# Patient Record
Sex: FEMALE | Race: WHITE | Employment: UNEMPLOYED | URBAN - METROPOLITAN AREA
[De-identification: names, ages, dates, MRNs, and addresses within clinical notes are randomized per-mention and may not be internally consistent; named-entity substitution may affect disease eponyms.]

---

## 2021-11-15 ENCOUNTER — HOSPITAL ENCOUNTER (OUTPATIENT)
Dept: LAB | Age: 71
Discharge: HOME | End: 2021-11-15
Payer: MEDICARE

## 2021-11-15 LAB
APPEARANCE UR: CLEAR
BILIRUB UR QL: NEGATIVE
COLOR UR: YELLOW
GLUCOSE UR STRIP.AUTO-MCNC: NEGATIVE MG/DL
HGB UR QL STRIP: NEGATIVE
KETONES UR QL STRIP.AUTO: ABNORMAL MG/DL
LEUKOCYTE ESTERASE UR QL STRIP.AUTO: NEGATIVE
NITRITE UR QL STRIP.AUTO: NEGATIVE
PH UR STRIP: 5.5 [PH] (ref 5–9)
PROT UR STRIP-MCNC: NEGATIVE MG/DL
SP GR UR REFRACTOMETRY: 1.01 (ref 1–1.02)
UROBILINOGEN UR QL STRIP.AUTO: 0.2 EU/DL (ref 0.2–1)

## 2021-11-15 PROCEDURE — 81003 URINALYSIS AUTO W/O SCOPE: CPT

## 2021-11-15 PROCEDURE — 87086 URINE CULTURE/COLONY COUNT: CPT

## 2021-11-18 LAB
BACTERIA SPEC CULT: NORMAL
SERVICE CMNT-IMP: NORMAL

## 2022-01-18 ENCOUNTER — TRANSCRIBE ORDER (OUTPATIENT)
Dept: SCHEDULING | Age: 72
End: 2022-01-18

## 2022-01-18 DIAGNOSIS — Z12.31 SCREENING MAMMOGRAM FOR BREAST CANCER: Primary | ICD-10-CM

## 2023-02-22 ENCOUNTER — HOSPITAL ENCOUNTER (INPATIENT)
Age: 73
LOS: 21 days | Discharge: NURSING, REHAB OR LONG-TERM CARE | End: 2023-03-24
Attending: EMERGENCY MEDICINE | Admitting: INTERNAL MEDICINE
Payer: MEDICARE

## 2023-02-22 ENCOUNTER — APPOINTMENT (OUTPATIENT)
Dept: GENERAL RADIOLOGY | Age: 73
End: 2023-02-22
Payer: MEDICARE

## 2023-02-22 ENCOUNTER — APPOINTMENT (OUTPATIENT)
Dept: CT IMAGING | Age: 73
End: 2023-02-22
Payer: MEDICARE

## 2023-02-22 DIAGNOSIS — F03.C0 SEVERE DEMENTIA, UNSPECIFIED DEMENTIA TYPE, UNSPECIFIED WHETHER BEHAVIORAL, PSYCHOTIC, OR MOOD DISTURBANCE OR ANXIETY (HCC): Primary | ICD-10-CM

## 2023-02-22 DIAGNOSIS — G89.29 CHRONIC MIDLINE LOW BACK PAIN WITHOUT SCIATICA: ICD-10-CM

## 2023-02-22 DIAGNOSIS — M54.50 CHRONIC MIDLINE LOW BACK PAIN WITHOUT SCIATICA: ICD-10-CM

## 2023-02-22 LAB
ALBUMIN SERPL-MCNC: 3.4 G/DL (ref 3.2–4.6)
ALBUMIN/GLOB SERPL: 0.9 (ref 0.4–1.6)
ALP SERPL-CCNC: 62 U/L (ref 50–136)
ALT SERPL-CCNC: 23 U/L (ref 12–65)
ANION GAP SERPL CALC-SCNC: 5 MMOL/L (ref 2–11)
APPEARANCE UR: ABNORMAL
AST SERPL-CCNC: 37 U/L (ref 15–37)
BACTERIA URNS QL MICRO: 0 /HPF
BASOPHILS # BLD: 0 K/UL (ref 0–0.2)
BASOPHILS NFR BLD: 1 % (ref 0–2)
BILIRUB SERPL-MCNC: 0.4 MG/DL (ref 0.2–1.1)
BILIRUB UR QL: ABNORMAL
BUN SERPL-MCNC: 17 MG/DL (ref 8–23)
CALCIUM SERPL-MCNC: 9.3 MG/DL (ref 8.3–10.4)
CHLORIDE SERPL-SCNC: 104 MMOL/L (ref 101–110)
CO2 SERPL-SCNC: 25 MMOL/L (ref 21–32)
COLOR UR: ABNORMAL
CREAT SERPL-MCNC: 1.1 MG/DL (ref 0.6–1)
DIFFERENTIAL METHOD BLD: ABNORMAL
EKG ATRIAL RATE: 97 BPM
EKG DIAGNOSIS: NORMAL
EKG P AXIS: 48 DEGREES
EKG P-R INTERVAL: 138 MS
EKG Q-T INTERVAL: 355 MS
EKG QRS DURATION: 88 MS
EKG QTC CALCULATION (BAZETT): 449 MS
EKG R AXIS: 106 DEGREES
EKG T AXIS: 101 DEGREES
EKG VENTRICULAR RATE: 96 BPM
EOSINOPHIL # BLD: 0.2 K/UL (ref 0–0.8)
EOSINOPHIL NFR BLD: 3 % (ref 0.5–7.8)
EPI CELLS #/AREA URNS HPF: ABNORMAL /HPF
ERYTHROCYTE [DISTWIDTH] IN BLOOD BY AUTOMATED COUNT: 17.1 % (ref 11.9–14.6)
GLOBULIN SER CALC-MCNC: 3.7 G/DL (ref 2.8–4.5)
GLUCOSE SERPL-MCNC: 93 MG/DL (ref 65–100)
GLUCOSE UR STRIP.AUTO-MCNC: NEGATIVE MG/DL
HCT VFR BLD AUTO: 36.6 % (ref 35.8–46.3)
HGB BLD-MCNC: 11.8 G/DL (ref 11.7–15.4)
HGB UR QL STRIP: NEGATIVE
IMM GRANULOCYTES # BLD AUTO: 0 K/UL (ref 0–0.5)
IMM GRANULOCYTES NFR BLD AUTO: 0 % (ref 0–5)
KETONES UR QL STRIP.AUTO: ABNORMAL MG/DL
LACTATE SERPL-SCNC: 1 MMOL/L (ref 0.4–2)
LEUKOCYTE ESTERASE UR QL STRIP.AUTO: ABNORMAL
LIPASE SERPL-CCNC: 89 U/L (ref 73–393)
LYMPHOCYTES # BLD: 1.5 K/UL (ref 0.5–4.6)
LYMPHOCYTES NFR BLD: 26 % (ref 13–44)
MCH RBC QN AUTO: 28.7 PG (ref 26.1–32.9)
MCHC RBC AUTO-ENTMCNC: 32.2 G/DL (ref 31.4–35)
MCV RBC AUTO: 89.1 FL (ref 82–102)
MONOCYTES # BLD: 0.5 K/UL (ref 0.1–1.3)
MONOCYTES NFR BLD: 9 % (ref 4–12)
NEUTS SEG # BLD: 3.6 K/UL (ref 1.7–8.2)
NEUTS SEG NFR BLD: 62 % (ref 43–78)
NITRITE UR QL STRIP.AUTO: NEGATIVE
NRBC # BLD: 0 K/UL (ref 0–0.2)
PH UR STRIP: 6.5 (ref 5–9)
PLATELET # BLD AUTO: 260 K/UL (ref 150–450)
PMV BLD AUTO: 9.7 FL (ref 9.4–12.3)
POTASSIUM SERPL-SCNC: 4 MMOL/L (ref 3.5–5.1)
PROT SERPL-MCNC: 7.1 G/DL (ref 6.3–8.2)
PROT UR STRIP-MCNC: ABNORMAL MG/DL
RBC # BLD AUTO: 4.11 M/UL (ref 4.05–5.2)
RBC #/AREA URNS HPF: ABNORMAL /HPF
SODIUM SERPL-SCNC: 134 MMOL/L (ref 133–143)
SP GR UR REFRACTOMETRY: 1.03 (ref 1–1.02)
TSH W FREE THYROID IF ABNORMAL: 1 UIU/ML (ref 0.36–3.74)
UROBILINOGEN UR QL STRIP.AUTO: 1 EU/DL (ref 0.2–1)
WBC # BLD AUTO: 5.7 K/UL (ref 4.3–11.1)
WBC URNS QL MICRO: ABNORMAL /HPF

## 2023-02-22 PROCEDURE — 81001 URINALYSIS AUTO W/SCOPE: CPT

## 2023-02-22 PROCEDURE — 94761 N-INVAS EAR/PLS OXIMETRY MLT: CPT

## 2023-02-22 PROCEDURE — 71045 X-RAY EXAM CHEST 1 VIEW: CPT

## 2023-02-22 PROCEDURE — 84443 ASSAY THYROID STIM HORMONE: CPT

## 2023-02-22 PROCEDURE — 6370000000 HC RX 637 (ALT 250 FOR IP): Performed by: EMERGENCY MEDICINE

## 2023-02-22 PROCEDURE — 83605 ASSAY OF LACTIC ACID: CPT

## 2023-02-22 PROCEDURE — 80053 COMPREHEN METABOLIC PANEL: CPT

## 2023-02-22 PROCEDURE — 2580000003 HC RX 258: Performed by: EMERGENCY MEDICINE

## 2023-02-22 PROCEDURE — 85025 COMPLETE CBC W/AUTO DIFF WBC: CPT

## 2023-02-22 PROCEDURE — 99285 EMERGENCY DEPT VISIT HI MDM: CPT

## 2023-02-22 PROCEDURE — 93005 ELECTROCARDIOGRAM TRACING: CPT | Performed by: EMERGENCY MEDICINE

## 2023-02-22 PROCEDURE — 83690 ASSAY OF LIPASE: CPT

## 2023-02-22 PROCEDURE — 70450 CT HEAD/BRAIN W/O DYE: CPT

## 2023-02-22 RX ORDER — TRAZODONE HYDROCHLORIDE 50 MG/1
50 TABLET ORAL NIGHTLY
Status: DISCONTINUED | OUTPATIENT
Start: 2023-02-22 | End: 2023-03-11

## 2023-02-22 RX ORDER — CITALOPRAM 20 MG/1
40 TABLET ORAL DAILY
Status: DISCONTINUED | OUTPATIENT
Start: 2023-02-23 | End: 2023-03-09

## 2023-02-22 RX ORDER — 0.9 % SODIUM CHLORIDE 0.9 %
1000 INTRAVENOUS SOLUTION INTRAVENOUS
Status: COMPLETED | OUTPATIENT
Start: 2023-02-22 | End: 2023-02-22

## 2023-02-22 RX ADMIN — TRAZODONE HYDROCHLORIDE 50 MG: 50 TABLET ORAL at 21:47

## 2023-02-22 RX ADMIN — SODIUM CHLORIDE 1000 ML: 900 INJECTION, SOLUTION INTRAVENOUS at 16:20

## 2023-02-22 ASSESSMENT — ENCOUNTER SYMPTOMS
VISUAL CHANGE: 0
SORE THROAT: 0
ABDOMINAL PAIN: 0
NAUSEA: 0
DIARRHEA: 0
RHINORRHEA: 0
VOMITING: 0
CONSTIPATION: 0
BACK PAIN: 0
DIFFICULTY BREATHING: 0
COLOR CHANGE: 0
SHORTNESS OF BREATH: 0
COUGH: 0
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
## 2023-02-22 NOTE — ED TRIAGE NOTES
Patient brought into triage with family. Per patient family patient has had increased confusion that has gradually increased. Patient family is concerned due to patient safety. Patient family states she is unable to be alone. Patient family states that patient was referred to the ER via her PCP. Patient family looking for help on what the next steps are. [id number] Exp Problem Focused - Mod. Complex

## 2023-02-22 NOTE — Clinical Note
Discharge Plan[de-identified] Extended Care Facility (e.g. Adult Home, Nursing Home, etc.) Comment: see case management notes   Telemetry/Cardiac Monitoring Required?: No
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
## 2023-02-23 PROCEDURE — 6370000000 HC RX 637 (ALT 250 FOR IP): Performed by: EMERGENCY MEDICINE

## 2023-02-23 RX ADMIN — TRAZODONE HYDROCHLORIDE 50 MG: 50 TABLET ORAL at 21:08

## 2023-02-23 RX ADMIN — CITALOPRAM HYDROBROMIDE 40 MG: 20 TABLET ORAL at 10:36

## 2023-02-23 NOTE — ED NOTES
Report given to [facility], transfer of care at this time.      Jeff Nelson, GIOVANNA  02/22/23 2601

## 2023-02-23 NOTE — CARE COORDINATION
Chart reviewed, Psych saw last evening and recommended placement. I have faxed clinicals to. .. Elian Gaspar. .. ARLEEN mccord/Stacia  [facility]. .... do not have a appropriate bed for pt, they are not \"treating dementia pt's currently\". Cammie Rivas. .. no beds per \"Cammie\"  [place]. .... [place]. .. Jadyn/admissions stated \"no, b/c they do not treat primary dementia\".

## 2023-02-24 PROCEDURE — 6370000000 HC RX 637 (ALT 250 FOR IP): Performed by: EMERGENCY MEDICINE

## 2023-02-24 RX ADMIN — TRAZODONE HYDROCHLORIDE 50 MG: 50 TABLET ORAL at 22:09

## 2023-02-24 RX ADMIN — CITALOPRAM HYDROBROMIDE 40 MG: 20 TABLET ORAL at 08:51

## 2023-02-24 ASSESSMENT — PAIN - FUNCTIONAL ASSESSMENT: PAIN_FUNCTIONAL_ASSESSMENT: NONE - DENIES PAIN

## 2023-02-24 NOTE — ED NOTES
Report given to [place], RN transfer of care at this time.      Shahida Faulkner RN  02/24/23 7655

## 2023-02-24 NOTE — ED NOTES
Patient's linens, brief, and gown changed. Patient given breakfast tray.      Favian Milian RN  02/24/23 6432

## 2023-02-25 LAB
APPEARANCE UR: CLEAR
BACTERIA URNS QL MICRO: NEGATIVE /HPF
BILIRUB UR QL: NEGATIVE
CASTS URNS QL MICRO: ABNORMAL /LPF
COLOR UR: ABNORMAL
EPI CELLS #/AREA URNS HPF: ABNORMAL /HPF
GLUCOSE UR STRIP.AUTO-MCNC: NEGATIVE MG/DL
HGB UR QL STRIP: NEGATIVE
KETONES UR QL STRIP.AUTO: NEGATIVE MG/DL
LEUKOCYTE ESTERASE UR QL STRIP.AUTO: ABNORMAL
NITRITE UR QL STRIP.AUTO: NEGATIVE
PH UR STRIP: 6 (ref 5–9)
PROT UR STRIP-MCNC: NEGATIVE MG/DL
RBC #/AREA URNS HPF: ABNORMAL /HPF
SP GR UR REFRACTOMETRY: 1.01 (ref 1–1.02)
UROBILINOGEN UR QL STRIP.AUTO: 0.2 EU/DL (ref 0.2–1)
WBC URNS QL MICRO: ABNORMAL /HPF

## 2023-02-25 PROCEDURE — 81001 URINALYSIS AUTO W/SCOPE: CPT

## 2023-02-25 PROCEDURE — 6370000000 HC RX 637 (ALT 250 FOR IP): Performed by: EMERGENCY MEDICINE

## 2023-02-25 RX ORDER — NICOTINE 21 MG/24HR
1 PATCH, TRANSDERMAL 24 HOURS TRANSDERMAL DAILY
Status: DISCONTINUED | OUTPATIENT
Start: 2023-02-25 | End: 2023-03-24 | Stop reason: HOSPADM

## 2023-02-25 RX ADMIN — TRAZODONE HYDROCHLORIDE 50 MG: 50 TABLET ORAL at 23:04

## 2023-02-25 RX ADMIN — CITALOPRAM HYDROBROMIDE 40 MG: 20 TABLET ORAL at 09:57

## 2023-02-25 ASSESSMENT — PAIN - FUNCTIONAL ASSESSMENT: PAIN_FUNCTIONAL_ASSESSMENT: NONE - DENIES PAIN
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
## 2023-02-25 NOTE — ED NOTES
Report given to [facility].      Katie Salazar RN  02/25/23 2276

## 2023-02-26 PROCEDURE — 6370000000 HC RX 637 (ALT 250 FOR IP): Performed by: EMERGENCY MEDICINE

## 2023-02-26 RX ADMIN — TRAZODONE HYDROCHLORIDE 50 MG: 50 TABLET ORAL at 22:41

## 2023-02-26 RX ADMIN — CITALOPRAM HYDROBROMIDE 40 MG: 20 TABLET ORAL at 10:37

## 2023-02-26 NOTE — ED PROVIDER NOTES
4097    I examined patient and she is resting comfortably in bed. She states that she has no physical complaints. She states she has been eating and drinking well. Patient's abdomen is soft and nontender. Her heart sounds are regular without any murmurs. Patient's lungs are clear to auscultation bilaterally. Patient is here under involuntary commitment paperwork for placement. Her papers  on  at 3 PM.  She has dementia and cannot care for herself. Case management social worker is trying to find placement for patient. Continue current medications. There is no acute issues needed addressed today in the ED.      Shrala Graff MD  23 [phone]

## 2023-02-26 NOTE — ED NOTES
Constant Observer No   Constant Observer Oriented N/A   High risk patients are in line of sight at all times No - no risk   Excess equipment/medical supplies not necessary for the care of the patient removed No - no risk   All sharp or dangerous objects are removed from room: including but not limited to belts, pens & pencils, needles, medications, cosmetics, lighters, matches, nail files, watches, necklaces, glass objects, razors, razor blades, knives, aerosol sprays, drawstring pants, shoes, cords (telephone, call bells, etc.) cleaning wipes or other cleaning items, aluminum cans, not permanently attached wall décor No - no risk   Telephone/cell phone removed as well as TV remote (batteries can be swallowed) No - no risk   Patient belongings removed and labeled at nurses station No - no risk   Excess linen is removed from room No - no risk   All plastic bags are removed from the room and replaced with paper trash bags No - no risk   Patient is in paper scrubs or appropriate gown and using hospital socks with rubber soles No - no risk   No metal, hard eating utensils or hard plates are on meal tray No - no risk   Remove all cleaning agents used by [employer] No - no risk   If Crucifix is hanging on a nail, remove Crucifix as well as the nail No - no risk       *If any question above is answered \"No,\" documentation is required.         Cameron Goldstein RN  02/26/23 4959

## 2023-02-26 NOTE — ED NOTES
Report from GIOVANNA Garrido. Assumed care of pt at this time. Pt resting with eyes closed and respirations present and unlabored. Cesario Martinez RN  02/26/23 6[facility]  02/26/23 6491

## 2023-02-26 NOTE — ED NOTES
Report to Ocampo garrett, RN. Care transferred at this time. yrl Code  Brooks Adair, [address] [facility]  02/26/23 8411

## 2023-02-26 NOTE — ED NOTES
This RN spoke with son, Kamar Schmid extensively about current plan of care for pt. Updated family that 2 extra referrals were sent out and would likely be answered tomorrow some time. Kamar Schmid is requesting pt possibly be transferred to [facility] d/t knowing staff at that facility. He says this facility is also close to him and son-in-law. Son states he would like to care for mother but is unable d/t hx with meds and wandering. This RN advised Kamar Schmid to call tomorrow between 9AM-5pm to speak with case management if not previously contacted. Lucita Barth info for family updated on chart. Stated someone will be called should placement be made.      Sheryl Lozada RN  02/26/23 8801

## 2023-02-26 NOTE — CARE COORDINATION
Patient discussed with ER Charge RN. Patient has not been offered a bed as she requires chelita psych. Referrals to [facility] who do have che[facility]ita psych units but have not yet been referred to.       Marcio Potts LMSW    [facility]

## 2023-02-26 NOTE — ED NOTES
Bedside and Verbal shift change report given to gretchen Orr (oncoming nurse) by Yudy Adam (offgoing nurse). Report included the following information Nurse Handoff Report, ED Encounter Summary and ED SBAR.       John Fernandez RN  02/25/23 6902

## 2023-02-27 LAB
SARS-COV-2 RDRP RESP QL NAA+PROBE: NOT DETECTED
SOURCE: NORMAL

## 2023-02-27 PROCEDURE — 6370000000 HC RX 637 (ALT 250 FOR IP): Performed by: EMERGENCY MEDICINE

## 2023-02-27 PROCEDURE — 87635 SARS-COV-2 COVID-19 AMP PRB: CPT

## 2023-02-27 RX ADMIN — CITALOPRAM HYDROBROMIDE 40 MG: 20 TABLET ORAL at 09:09

## 2023-02-27 RX ADMIN — TRAZODONE HYDROCHLORIDE 50 MG: 50 TABLET ORAL at 21:38

## 2023-02-27 ASSESSMENT — PAIN - FUNCTIONAL ASSESSMENT
PAIN_FUNCTIONAL_ASSESSMENT: NONE - DENIES PAIN
PAIN_FUNCTIONAL_ASSESSMENT: NONE - DENIES PAIN

## 2023-02-27 NOTE — CARE COORDINATION
1650 Follow up note:    LMSW received update from coworker that [address] may have a chelita psych placement available. Called [phone] and faxed [phone] referral to [address] referral line. They have requested that a COVID PCR be done so they can consider pt. They will review referral and I anticipate that a physician from [address] [place] [address] [address] will be calling ED MD to discuss pt care. They have ED phone number for follow up. Spoke with Tanna Fox at [employer]. Re-faxed pt referral as he could not locate the one sent yesterday. Await review. 1252pm Follow up note:    [facility] has declined pt. [facility], await call back. Call placed to [facility] in [facility] as they have a chelita psych unit. They have no beds available and did not want a referral faxed as they have no anticipated discharges at this time. Call placed to [facility]  (Geriatric Psych Unit) in [address] [address], Phone [phone],  Fax [phone]. Left a message for admissions Lesly Bell cell # [phone] with request for a call back to discuss this potential referral.  Await call back and will fax if they will accept a referral. Call back received. They are now an adolescent/young adult residential treatment facility only. AM note:  LMSW follow up today regarding pt need for chelita psych care. Left message with admissions at [facility] with request for a call back about pt referral.      Also confirmed that [facility] unit is not accepting outside referrals anymore. Called/faxed referral to [facility] for [facility]. Await review.

## 2023-02-27 NOTE — ED NOTES
72-year-old female patient originally presenting with dementia, altered mental status, inability to care for herself. Initial evaluating provider Notes grave disability, and ability to care for herself safely at home. Currently being monitored in this facility until safe placement can be arranged. Patient reassessed this morning. Resting comfortably, breakfast at bedside. Patient denies any active complaints at this point. She is currently awaiting placement secondary to inability to care for herself. She denies any discomfort, distress at this point. We will plan to speak with case management regarding updates on potential placement options. No acute changes.      601 [facility]  02/27/23 1957

## 2023-02-27 NOTE — ED NOTES
Patient visited by family, coffee refilled. Patient in a pleasant mood.       Asif Waters, RN  02/27/23 6331

## 2023-02-27 NOTE — ED NOTES
covid swab completed, patient cooperative, sitting up to eat dinner.       Gris Eldridge RN  02/27/23 7592

## 2023-02-27 NOTE — ED NOTES
Constant Observer No   Constant Observer Oriented N/A   High risk patients are in line of sight at all times no   Excess equipment/medical supplies not necessary for the care of the patient removed No -    All sharp or dangerous objects are removed from room: including but not limited to belts, pens & pencils, needles, medications, cosmetics, lighters, matches, nail files, watches, necklaces, glass objects, razors, razor blades, knives, aerosol sprays, drawstring pants, shoes, cords (telephone, call bells, etc.) cleaning wipes or other cleaning items, aluminum cans, not permanently attached wall décor No -    Telephone/cell phone removed as well as TV remote (batteries can be swallowed) No -    Patient belongings removed and labeled at nurses station No -    Excess linen is removed from room No -    All plastic bags are removed from the room and replaced with paper trash bags No -    Patient is in paper scrubs or appropriate gown and using hospital socks with rubber soles No -    No metal, hard eating utensils or hard plates are on meal tray No -    Remove all cleaning agents used by [employer] No -    If Crucifix is hanging on a nail, remove Crucifix as well as the nail No -        *If any question above is answered \"No,\" documentation is required.       Cleve Severance, RN  02/27/23 4859

## 2023-02-27 NOTE — ED NOTES
Handoff report given to Juana Rader RN. Care transferred at this time.       Francia Rodriguez RN  02/27/23 4283

## 2023-02-27 NOTE — ED NOTES
Handoff report received from [place], [facility]. This RN to assume care at this time.      Devi Duncan RN  02/27/23 9326

## 2023-02-27 NOTE — ED NOTES
Constant Observer No   Constant Observer Oriented N/A   High risk patients are in line of sight at all times No - no risk   Excess equipment/medical supplies not necessary for the care of the patient removed No - no risk   All sharp or dangerous objects are removed from room: including but not limited to belts, pens & pencils, needles, medications, cosmetics, lighters, matches, nail files, watches, necklaces, glass objects, razors, razor blades, knives, aerosol sprays, drawstring pants, shoes, cords (telephone, call bells, etc.) cleaning wipes or other cleaning items, aluminum cans, not permanently attached wall décor No - no risk   Telephone/cell phone removed as well as TV remote (batteries can be swallowed) No - no risk   Patient belongings removed and labeled at nurses station No - no risk   Excess linen is removed from room No - no risk   All plastic bags are removed from the room and replaced with paper trash bags No - no risk   Patient is in paper scrubs or appropriate gown and using hospital socks with rubber soles No - no risk   No metal, hard eating utensils or hard plates are on meal tray No - no risk   Remove all cleaning agents used by [employer] No - no risk   If Crucifix is hanging on a nail, remove Crucifix as well as the nail No - no risk       *If any question above is answered \"No,\" documentation is required.       Karoline Hoffmann RN  02/27/23 9601

## 2023-02-27 NOTE — ED NOTES
[facility] consult entered for re-evaluation.  [facility]  02/27/23 3359

## 2023-02-28 PROBLEM — G31.84 MILD COGNITIVE IMPAIRMENT: Status: ACTIVE | Noted: 2022-06-27

## 2023-02-28 PROBLEM — F41.9 ANXIETY: Status: ACTIVE | Noted: 2017-09-14

## 2023-02-28 PROBLEM — K21.9 GASTROESOPHAGEAL REFLUX DISEASE: Status: ACTIVE | Noted: 2022-12-08

## 2023-02-28 PROCEDURE — 97161 PT EVAL LOW COMPLEX 20 MIN: CPT

## 2023-02-28 PROCEDURE — 97530 THERAPEUTIC ACTIVITIES: CPT

## 2023-02-28 PROCEDURE — 6370000000 HC RX 637 (ALT 250 FOR IP): Performed by: EMERGENCY MEDICINE

## 2023-02-28 RX ORDER — ALBUTEROL SULFATE 90 UG/1
2 AEROSOL, METERED RESPIRATORY (INHALATION)
Status: ON HOLD | COMMUNITY
Start: 2022-12-09 | End: 2023-03-24 | Stop reason: SDUPTHER

## 2023-02-28 RX ORDER — CITALOPRAM 40 MG/1
40 TABLET ORAL DAILY
Status: ON HOLD | COMMUNITY
Start: 2022-12-09 | End: 2023-03-24 | Stop reason: HOSPADM

## 2023-02-28 RX ADMIN — TRAZODONE HYDROCHLORIDE 50 MG: 50 TABLET ORAL at 22:18

## 2023-02-28 RX ADMIN — CITALOPRAM HYDROBROMIDE 40 MG: 20 TABLET ORAL at 09:39

## 2023-02-28 NOTE — PROGRESS NOTES
ACUTE PHYSICAL THERAPY GOALS:   (Developed with and agreed upon by patient and/or caregiver.)  No goals set. Pt with no skilled needs. PHYSICAL THERAPY Initial Assessment, Daily Note, Discharge, and PM  (Link to Caseload Tracking:    Acknowledge Orders  Time In/Out  PT Charge Capture  Rehab Caseload Tracker    Josué Ugalde is a 81 y.o. female   PRIMARY DIAGNOSIS: <principal problem not specified>  No admission diagnoses are documented for this encounter. Reason for Referral: Difficulty in walking, Not elsewhere classified (R26.2)  Other abnormalities of gait and mobility (R26.89)  Emergency: Payor: Trish Bales / Plan: [facility] HMO / Product Type: *No Product type* /     ASSESSMENT:     REHAB RECOMMENDATIONS:   Recommendation to date pending progress:  Setting: To be determined    Equipment:    None     ASSESSMENT:  Ms. Anastasiia Elizabeth is admitted with above diagnosis. Pt performed supine to sit to stand. Pt ambulated in room and hallway. Pt returned supine. Pt supine with needs in reach. Pt with no skilled needs identified. [facility] [address] [address] AM-PAC 6 Clicks Basic Mobility Inpatient Short Form  AM-PAC Basic Mobility - Inpatient   How much help is needed turning from your back to your side while in a flat bed without using bedrails?: None  How much help is needed moving from lying on your back to sitting on the side of a flat bed without using bedrails?: None  How much help is needed moving to and from a bed to a chair?: None  How much help is needed standing up from a chair using your arms?: None  How much help is needed walking in hospital room?: None  How much help is needed climbing 3-5 steps with a railing?: None  AM-PAC Inpatient Mobility Raw Score : 24  AM-PAC Inpatient T-Scale Score : 61.14  Mobility Inpatient CMS 0-100% Score: 0  Mobility Inpatient CMS G-Code Modifier : CH    SUBJECTIVE:   Ms. Anastasiia Elizabeth states, [de-identified]. \"     Social/Functional Lives With: Family    OBJECTIVE:     PAIN: VITALS discussed with the patient.)  none       TREATMENT:   EVALUATION: LOW COMPLEXITY: (Untimed Charge)    TREATMENT:   Therapeutic Activity (25 Minutes): Therapeutic activity included Supine to Sit, Sit to Supine, Transfer Training, Ambulation on level ground, Sitting balance , and Standing balance to improve functional Activity tolerance, Balance, Coordination, Mobility, and Strength. TREATMENT GRID:  N/A    AFTER TREATMENT PRECAUTIONS: Bed, Bed/Chair Locked, Call light within reach, and RN notified    INTERDISCIPLINARY COLLABORATION:  RN/ PCT and PT/ PTA    EDUCATION: Education Given To: Patient; Family  Education Provided: Role of Therapy;Plan of Care;Home Exercise Program;Transfer Training  Education Method: Demonstration;Verbal  Education Outcome: Verbalized understanding;Demonstrated understanding    TIME IN/OUT:  Time In: 1520  Time Out: [place]  Minutes: [facility],

## 2023-02-28 NOTE — ED NOTES
Handoff report given to Josep Brown RN. Care transferred at this time.       Jerry Henriquez RN  02/28/23 0880

## 2023-02-28 NOTE — ED PROVIDER NOTES
There was an attempt to place patient at nursing facility but patient does not require skilled care. She is amatory without issues. She does have issues with dementia. May be candidate for memory care.  seen. We will get OT and PT consultation to prove patient is able to go to assisted living.      Diego Keyes MD  02/28/23 9594

## 2023-02-28 NOTE — CONSULTS
Allergies   Allergen Reactions    Aspirin Other (See Comments)     Family states patient is not allergic to Aspirin        Current Facility-Administered Medications   Medication Dose Route Frequency    nicotine (NICODERM CQ) 14 MG/24HR 1 patch  1 patch TransDERmal Daily    traZODone (DESYREL) tablet 50 mg  50 mg Oral Nightly    citalopram (CELEXA) tablet 40 mg  40 mg Oral Daily     Current Outpatient Medications   Medication Sig    budesonide-formoterol (SYMBICORT) 160-4.5 MCG/ACT AERO Inhale 160 Inhalers into the lungs    HYDROcodone-acetaminophen (NORCO)  MG per tablet Take 1 tablet by mouth every 6 hours as needed. morphine (MS CONTIN) 15 MG extended release tablet Take 15 mg by mouth every 12 hours. oxyCODONE-acetaminophen (PERCOCET) 5-325 MG per tablet Take 1 tablet by mouth every 6 hours as needed. polyethylene glycol (GLYCOLAX) 17 GM/SCOOP powder Take 17 g by mouth    traZODone (DESYREL) 300 MG tablet Take 150 mg by mouth       Objective:   Patient Vitals for the past 24 hrs:   Temp Pulse Resp BP SpO2   02/28/23 0945 98.1 °F (36.7 °C) 81 15 131/65 99 %   02/27/23 1827 98.3 °F (36.8 °C) 84 14 125/64 100 %       Oxygen Therapy  SpO2: 99 %  O2 Device: None (Room air)    Estimated body mass index is 15 kg/m² as calculated from the following:    Height as of this encounter: 5' 2\" (1.575 m). Weight as of this encounter: 82 lb (37.2 kg). No intake or output data in the 24 hours ending 02/28/23 1657      Blood pressure 131/65, pulse 81, temperature 98.1 °F (36.7 °C), temperature source Oral, resp. rate 15, height 5' 2\" (1.575 m), weight 82 lb (37.2 kg), SpO2 99 %. Physical Exam  Vitals and nursing note reviewed. Constitutional:       General: She is not in acute distress. Appearance: She is underweight. She is not ill-appearing or diaphoretic. Eyes:      Extraocular Movements: Extraocular movements intact. Cardiovascular:      Rate and Rhythm: Normal rate.    Pulmonary:      Effort: Pulmonary effort is normal. No respiratory distress. Abdominal:      General: There is no distension. Musculoskeletal:         General: No deformity. Skin:     Coloration: Skin is not jaundiced or pale. Neurological:      General: No focal deficit present. Mental Status: She is alert and oriented to person, place, and time. Psychiatric:         Mood and Affect: Mood normal.         Behavior: Behavior normal.         Thought Content: Thought content does not include homicidal or suicidal ideation. I have personally reviewed labs and tests showing:  Recent Labs:  No results found for this or any previous visit (from the past 24 hour(s)). I have personally reviewed imaging studies showing:  No results found. Echocardiogram:  No results found for this or any previous visit.         Signed:  Mita Pineda MD

## 2023-02-28 NOTE — PROGRESS NOTES
Occupational Therapy Note:    Spoke at length with PT regarding this patient this PM about occupational therapy evaluation. PT reports pt is independent with all mobility and reports pt has been in the ED for several days and supervision to independent with all care. No further OT warranted.    Thank you,    Arnie Rogel, OT    [facility]

## 2023-02-28 NOTE — ED NOTES
Handoff report received from Liz Ryan, [facility]. This RN to assume care at this time.       Sha Davis RN  02/27/23 2012

## 2023-02-28 NOTE — CARE COORDINATION
8162:  Per Diane Limon ( ), patient was denied by [address] as patient does not meet criteria for inpatient psych. [address] physician spoke with [facility] ED physician regarding denial.      1010:  Phone call with patient's Pierce Sanchez ([phone]). Per Jaime Longoria, he will call me back as soon as possible. 1055:  Phone call received from patient's son Bertha Crook, [phone]). Kobe Yarbrough then put a family friend/coworker/caregiver to patient Chelle Limatingham, [phone]) on the phone to speak with . Family was informed that patient is no longer meeting criteria for inpatient psychiatric care per telepsych and ED physician. SW educated family on resources that can be implemented to ensure patient's safety/wellbeing after discharge (assisted living/memory care facilities, private care givers, etc). Dimple Sen states that that patient is not taking her medications regularly and is essentially homeless. Dimple Sen listed additional concerns: patient is putting frozen foods in the pantry, not washing her clothes correctly, eating cat food, performing oral sex on men, wandering down the highway, and giving Jimmy's dogs medication that killed them. Per Dimple Sen, they are not willing to come to the hospital to pick-up patient at this time. SW advised Dimple Sen that a report will be made to Adult [facility]. No expressed understanding. 1130:  Phone call to [employer] ([phone]). Report provided to [facility] as family is unable to safely care for patient and declining to pick her up from the hospital at this time. 1345:  Phone call received from [facility] with DSS stating that report was declined for Adult Protective Services Investigation. 1420:  PT/OT evaluations ordered.       1425:  Chart reviewed - Patient's care team at [facility] consists of Anna Mendoza (Zee , [phone]) and Laura Ordoñez [facility] ,

## 2023-02-28 NOTE — CARE COORDINATION
Patient discussed with staff and multiple concerns noted regarding this patient. SW attempted to call the patient's son Fara Mallory and her son-in-law (and reported roommate per patient) Forde Koyanagi with no answer. Call made 2x's to assist in clarifying information and offer resources with no answer. Call to [address] [place] [address], report made to [facility]. SW requested a call back advising if the case had been accepted for investigation or not. Update: SW received a call at 8 pm on 2/28 advising that the case had been accepted for investigation. Staff updated. Requested ER RN notify SW should APS arrive at the bedside and obtain contact information for investigator.      Shahla Zamudio LMSW    [facility]

## 2023-03-01 PROCEDURE — 6370000000 HC RX 637 (ALT 250 FOR IP): Performed by: EMERGENCY MEDICINE

## 2023-03-01 RX ADMIN — TRAZODONE HYDROCHLORIDE 50 MG: 50 TABLET ORAL at 20:15

## 2023-03-01 RX ADMIN — CITALOPRAM HYDROBROMIDE 40 MG: 20 TABLET ORAL at 08:31

## 2023-03-01 NOTE — CARE COORDINATION
Per Omar Subramanian ([facility] ), the report she made to [facility] on 2/28/23 was accepted by Adult Protective Services Investigation. A DSS/APS representative should be coming to the hospital today for follow-up.     DARION Cee, [address] [facility]   [phone]

## 2023-03-01 NOTE — CARE COORDINATION
[facility] [place] [address]  (cell - [phone]; office [phone]). States that they cannot take her into Emergency Protective Services because there is no documentation that she is unable to make decisions for herself. States she will call son and discuss but that patient wants to go to a facility and is agreeable to that plan. States patient denied giving oral sex to neighbors and states that is a false statement. States patient was able to carry on a good conversation with her. States she knows of one emergency shelter that she will talk to to see if she can get her placed more quickly. States she needs to have nursing home Medicaid. I informed her that the  at [facility] attempted to get that for her but the son would not cooperate with them. States she will call the son today and discuss with him and will call me back no later than Friday.

## 2023-03-01 NOTE — ED NOTES
Michael Toro NRP changed patient's linens, wiped down chair, and mopped floor.      Cherie John RN  03/01/23 8182

## 2023-03-01 NOTE — ED NOTES
Report received from [facility], assumed care of patient.      Saleem Kingsley, RN  03/01/23 5653

## 2023-03-01 NOTE — ED NOTES
Critical Care  Performed by: Carie Villeda MD  Authorized by: Steve Dee MD     Critical care provider statement:     Critical care time (minutes):  65    Critical care was necessary to treat or prevent imminent or life-threatening deterioration of the following conditions: Danger to self. Critical care was time spent personally by me on the following activities:  Discussions with consultants, examination of patient, obtaining history from patient or surrogate, ordering and performing treatments and interventions, ordering and review of laboratory studies, review of old charts and re-evaluation of patient's condition  Comments:      Numerous conversations with case management, hospitalist regarding safe disposition for patient. Patient is a danger to herself with advanced dementia and dangerous behaviors like wandering in the street when performing oral sex maneuvers. Patient with need for placement however does not have funding and does not have family that is willing or able to assist with her care. Attempted to admit patient to hospitalist however they declined admission for lack of medical necessity. Numerous conversations with hospitalist and case management and attempts to disposition patient safely.        Carie Villeda MD  03/01/23 7898

## 2023-03-02 PROCEDURE — 6370000000 HC RX 637 (ALT 250 FOR IP): Performed by: EMERGENCY MEDICINE

## 2023-03-02 RX ADMIN — TRAZODONE HYDROCHLORIDE 50 MG: 50 TABLET ORAL at 22:07

## 2023-03-02 RX ADMIN — CITALOPRAM HYDROBROMIDE 40 MG: 20 TABLET ORAL at 09:45

## 2023-03-02 NOTE — ED NOTES
Report from IGOVANNA Garrido. Assumed care of pt at this time. Nguyễn Connell, [facility]  03/02/23 8562

## 2023-03-02 NOTE — ED PROVIDER NOTES
Reviewed pt chart. D/w admin about dispo. Will hope APS takes pt's case tomorrow. VSS. Pt is grateful that we are taking care of her.        Eula Leong MD  03/02/23 8001

## 2023-03-03 PROBLEM — J45.20 INTERMITTENT ASTHMA: Status: ACTIVE | Noted: 2023-03-03

## 2023-03-03 PROBLEM — Z78.9 UNABLE TO CARE FOR SELF: Status: ACTIVE | Noted: 2023-03-03

## 2023-03-03 PROBLEM — F03.90 DEMENTIA (HCC): Status: ACTIVE | Noted: 2022-06-27

## 2023-03-03 PROBLEM — F17.200 NICOTINE ADDICTION: Status: ACTIVE | Noted: 2023-03-03

## 2023-03-03 PROBLEM — G89.4 CHRONIC PAIN SYNDROME: Status: ACTIVE | Noted: 2023-03-03

## 2023-03-03 PROCEDURE — 6370000000 HC RX 637 (ALT 250 FOR IP): Performed by: INTERNAL MEDICINE

## 2023-03-03 PROCEDURE — 6370000000 HC RX 637 (ALT 250 FOR IP): Performed by: EMERGENCY MEDICINE

## 2023-03-03 PROCEDURE — 1100000000 HC RM PRIVATE

## 2023-03-03 RX ORDER — HYDROXYZINE PAMOATE 25 MG/1
25 CAPSULE ORAL EVERY 6 HOURS PRN
Status: DISCONTINUED | OUTPATIENT
Start: 2023-03-03 | End: 2023-03-24 | Stop reason: HOSPADM

## 2023-03-03 RX ORDER — SODIUM CHLORIDE 0.9 % (FLUSH) 0.9 %
5-40 SYRINGE (ML) INJECTION PRN
Status: DISCONTINUED | OUTPATIENT
Start: 2023-03-03 | End: 2023-03-11

## 2023-03-03 RX ORDER — TRIAMCINOLONE ACETONIDE 1 MG/G
CREAM TOPICAL 2 TIMES DAILY
Status: DISCONTINUED | OUTPATIENT
Start: 2023-03-03 | End: 2023-03-24 | Stop reason: HOSPADM

## 2023-03-03 RX ORDER — ONDANSETRON 4 MG/1
4 TABLET, ORALLY DISINTEGRATING ORAL EVERY 8 HOURS PRN
Status: DISCONTINUED | OUTPATIENT
Start: 2023-03-03 | End: 2023-03-24 | Stop reason: HOSPADM

## 2023-03-03 RX ORDER — BISACODYL 10 MG
10 SUPPOSITORY, RECTAL RECTAL DAILY PRN
Status: DISCONTINUED | OUTPATIENT
Start: 2023-03-03 | End: 2023-03-24 | Stop reason: HOSPADM

## 2023-03-03 RX ORDER — FAMOTIDINE 20 MG/1
10 TABLET, FILM COATED ORAL DAILY PRN
Status: DISCONTINUED | OUTPATIENT
Start: 2023-03-03 | End: 2023-03-24 | Stop reason: HOSPADM

## 2023-03-03 RX ORDER — MAGNESIUM HYDROXIDE/ALUMINUM HYDROXICE/SIMETHICONE 120; 1200; 1200 MG/30ML; MG/30ML; MG/30ML
30 SUSPENSION ORAL EVERY 6 HOURS PRN
Status: DISCONTINUED | OUTPATIENT
Start: 2023-03-03 | End: 2023-03-24 | Stop reason: HOSPADM

## 2023-03-03 RX ORDER — SODIUM CHLORIDE 0.9 % (FLUSH) 0.9 %
5-40 SYRINGE (ML) INJECTION EVERY 12 HOURS SCHEDULED
Status: DISCONTINUED | OUTPATIENT
Start: 2023-03-03 | End: 2023-03-11

## 2023-03-03 RX ORDER — POTASSIUM CHLORIDE 7.45 MG/ML
10 INJECTION INTRAVENOUS PRN
Status: DISCONTINUED | OUTPATIENT
Start: 2023-03-03 | End: 2023-03-24 | Stop reason: HOSPADM

## 2023-03-03 RX ORDER — POTASSIUM CHLORIDE 20 MEQ/1
40 TABLET, EXTENDED RELEASE ORAL PRN
Status: DISCONTINUED | OUTPATIENT
Start: 2023-03-03 | End: 2023-03-24 | Stop reason: HOSPADM

## 2023-03-03 RX ORDER — ACETAMINOPHEN 650 MG/1
650 SUPPOSITORY RECTAL EVERY 6 HOURS PRN
Status: DISCONTINUED | OUTPATIENT
Start: 2023-03-03 | End: 2023-03-24 | Stop reason: HOSPADM

## 2023-03-03 RX ORDER — ACETAMINOPHEN 325 MG/1
650 TABLET ORAL EVERY 6 HOURS PRN
Status: DISCONTINUED | OUTPATIENT
Start: 2023-03-03 | End: 2023-03-24 | Stop reason: HOSPADM

## 2023-03-03 RX ORDER — SODIUM CHLORIDE 9 MG/ML
INJECTION, SOLUTION INTRAVENOUS PRN
Status: DISCONTINUED | OUTPATIENT
Start: 2023-03-03 | End: 2023-03-24 | Stop reason: HOSPADM

## 2023-03-03 RX ORDER — POLYETHYLENE GLYCOL 3350 17 G/17G
17 POWDER, FOR SOLUTION ORAL DAILY PRN
Status: DISCONTINUED | OUTPATIENT
Start: 2023-03-03 | End: 2023-03-24 | Stop reason: HOSPADM

## 2023-03-03 RX ORDER — MAGNESIUM SULFATE IN WATER 40 MG/ML
2000 INJECTION, SOLUTION INTRAVENOUS PRN
Status: DISCONTINUED | OUTPATIENT
Start: 2023-03-03 | End: 2023-03-24 | Stop reason: HOSPADM

## 2023-03-03 RX ORDER — ONDANSETRON 2 MG/ML
4 INJECTION INTRAMUSCULAR; INTRAVENOUS EVERY 6 HOURS PRN
Status: DISCONTINUED | OUTPATIENT
Start: 2023-03-03 | End: 2023-03-24 | Stop reason: HOSPADM

## 2023-03-03 RX ADMIN — CITALOPRAM HYDROBROMIDE 40 MG: 20 TABLET ORAL at 09:05

## 2023-03-03 RX ADMIN — TRIAMCINOLONE ACETONIDE: 1 CREAM TOPICAL at 21:55

## 2023-03-03 RX ADMIN — TRAZODONE HYDROCHLORIDE 50 MG: 50 TABLET ORAL at 21:49

## 2023-03-03 RX ADMIN — HYDROXYZINE PAMOATE 25 MG: 25 CAPSULE ORAL at 21:56

## 2023-03-03 NOTE — CARE COORDINATION
Call to [facility] . Renuka Villegas states that she is driving in a state car and they are not allowed to talk on the phone and she will have to call me back.

## 2023-03-03 NOTE — ED NOTES
TRANSFER - OUT REPORT:    Verbal report given to Rosemarie Timmons RN on Jose Ho  being transferred to [facility] for urgent transfer       Report consisted of patient's Situation, Background, Assessment and   Recommendations(SBAR). Information from the following report(s) Nurse Handoff Report, Index, ED Encounter Summary, ED SBAR, Adult Overview, Intake/Output, MAR, Recent Results, and Med Rec Status was reviewed with the receiving nurse. [place] Assessment: No data recorded  Lines:   Peripheral IV 02/22/23 Left Antecubital (Active)        Opportunity for questions and clarification was provided.       Patient transported with:  Transport           Jluis Miller RN  03/03/23 4220

## 2023-03-03 NOTE — ED NOTES
Adult Protective Services were not able to come up with any plan today I have communicated with Ashanti Luong several times today. Given no safe discharge plan I have communicated with the hospitalist who agreed to admit patient. Patient currently calm and in no distress.    Abel Valdovinos MD 3:26 PM 3/3/2023       Abel Valdovinos MD  03/03/23 1527

## 2023-03-03 NOTE — H&P
CODE STATUS        Assessment & Plan: Active Problems:  --- Behavioral health hold   Inability to care for self. Seeking geriatric psychiatric placement. Adult Protective Services case open. Case management following. No suicidal or homicidal ideations and no significant behavioral abnormalities with current medications citalopram 40 mg daily and trazodone 50 mg at bedtime as well as nicotine replacement. Supportive care pending placement      --- Dementia with behavioral abnormalities   3/3-monitor. Continue current meds    --- Asthma   Continue home Symbicort 160/4.5 or surrogate may have as needed albuterol    --- Chronic pain syndrome--klu-jtfvjur-hoeirya related to psychiatric condition   Monitor off medications-admitted here involuntary commitment February 22 and no current pain meds and no significant complaints. Avoid unnecessary restart medications as likely dependence prior to hospital.    --Nicotine dependence--continue nicotine patch    --Pruritus   -- Triamcinolone cream twice daily as needed. Hydroxyzine as needed for itching    Addendum-recheck urinalysis reflex to culture if needed-questionable prior specimen possible [id number]-[id number]      PT/OT evals and PPD needed/ordered? Yes  Diet: ADULT DIET; Regular; 3 carb choices (45 gm/meal); Low Fat/Low Chol/High Fiber/CURT  VTE prophylaxis: Lovenox  Code status: Full Code    Hospital Problems:  Active Problems:    Dementia ([address]    Anxiety    Unable to care for self    Nicotine addiction    Chronic pain syndrome    Intermittent asthma  Resolved Problems:    * No resolved hospital problems.  *       Past History:     Past Medical History:   Diagnosis Date    Asthma     Chronic pain syndrome     Dementia ([address]     Neurological disorder     herniated discs       Past Surgical History:   Procedure Laterality Date    HYSTERECTOMY (CERVIX STATUS UNKNOWN)      ORTHOPEDIC SURGERY      jaw surgery        Social History     Tobacco Use Therapy  SpO2: 96 %  O2 Device: None (Room air)    Estimated body mass index is 15 kg/m² as calculated from the following:    Height as of this encounter: 5' 2\" (1.575 m). Weight as of this encounter: 82 lb (37.2 kg). No intake or output data in the 24 hours ending 03/03/23 3421      Physical Exam:    Blood pressure (!) 114/54, pulse 78, temperature 97.9 °F (36.6 °C), temperature source Oral, resp. rate 20, height 5' 2\" (1.575 m), weight 82 lb (37.2 kg), SpO2 96 %. General:    Lean body mass but pleasant cooperative alert and oriented person and place. Short-term recall is poor  Head:  Normocephalic, atraumatic  Eyes:  Sclerae appear normal.  Pupils equally round. ENT:  Nares appear normal.  Moist oral mucosa  Neck:  No restricted ROM. Trachea midline   CV:   RRR. No m/r/g. No jugular venous distension. Lungs:   CTAB. No wheezing, rhonchi, or rales. Symmetric expansion. Abdomen:   Soft, nontender, nondistended. Extremities: No cyanosis or clubbing. No unilateral lower extremity edema  Skin:     No rashes and normal coloration. Warm and dry. Neuro:  CN II-XII grossly intact. Sensation intact. Psych:  Pleasant cooperative denies suicidal or homicidal ideations         I have personally reviewed labs and tests:  Recent Labs:  No results found for this or any previous visit (from the past 24 hour(s)). I have personally reviewed imaging studies:  No results found. Echocardiogram:  No results found for this or any previous visit.         Orders Placed This Encounter   Medications    0.9 % sodium chloride bolus    traZODone (DESYREL) tablet 50 mg    citalopram (CELEXA) tablet 40 mg    DISCONTD: nicotine (NICODERM CQ) 7 MG/24HR 1 patch    nicotine (NICODERM CQ) 14 MG/24HR 1 patch    sodium chloride flush 0.9 % injection 5-40 mL    sodium chloride flush 0.9 % injection 5-40 mL    0.9 % sodium chloride infusion    OR Linked Order Group     potassium chloride (KLOR-CON M) extended release

## 2023-03-03 NOTE — Clinical Note
No call back from [facility] from [facility] so attempted to call again. Message left. Notified Dr Evelia Huerta that it was unlikely we would be finding placement today.

## 2023-03-03 NOTE — CARE COORDINATION
No call back from Rhina Leonardo with APS so called again - message left. Notified Dr Priya Wick that it was unlikely that we would be finding placement today.

## 2023-03-03 NOTE — ED NOTES
Patient resting comfortably in bed. No distress noted at this time.       Otis Patterson RN  03/03/23 3910

## 2023-03-03 NOTE — PROGRESS NOTES
Pt arrived to unit alert and oriented x3, pt disoriented to time. Pt oriented to room and call light.  No needs at this time

## 2023-03-04 PROBLEM — R82.81 PYURIA: Status: ACTIVE | Noted: 2023-03-04

## 2023-03-04 PROBLEM — E87.6 HYPOKALEMIA: Status: ACTIVE | Noted: 2023-03-04

## 2023-03-04 LAB
ANION GAP SERPL CALC-SCNC: 6 MMOL/L (ref 2–11)
APPEARANCE UR: CLEAR
BACTERIA URNS QL MICRO: NEGATIVE /HPF
BASOPHILS # BLD: 0 K/UL (ref 0–0.2)
BASOPHILS NFR BLD: 1 % (ref 0–2)
BILIRUB UR QL: NEGATIVE
BUN SERPL-MCNC: 17 MG/DL (ref 8–23)
CALCIUM SERPL-MCNC: 8.8 MG/DL (ref 8.3–10.4)
CASTS URNS QL MICRO: ABNORMAL /LPF
CHLORIDE SERPL-SCNC: 107 MMOL/L (ref 101–110)
CO2 SERPL-SCNC: 27 MMOL/L (ref 21–32)
COLOR UR: ABNORMAL
CREAT SERPL-MCNC: 0.69 MG/DL (ref 0.6–1)
DIFFERENTIAL METHOD BLD: ABNORMAL
EOSINOPHIL # BLD: 0 K/UL (ref 0–0.8)
EOSINOPHIL NFR BLD: 0 % (ref 0.5–7.8)
EPI CELLS #/AREA URNS HPF: ABNORMAL /HPF
ERYTHROCYTE [DISTWIDTH] IN BLOOD BY AUTOMATED COUNT: 16.1 % (ref 11.9–14.6)
GLUCOSE SERPL-MCNC: 95 MG/DL (ref 65–100)
GLUCOSE UR STRIP.AUTO-MCNC: NEGATIVE MG/DL
HCT VFR BLD AUTO: 33 % (ref 35.8–46.3)
HGB BLD-MCNC: 10.7 G/DL (ref 11.7–15.4)
HGB UR QL STRIP: NEGATIVE
IMM GRANULOCYTES # BLD AUTO: 0 K/UL (ref 0–0.5)
IMM GRANULOCYTES NFR BLD AUTO: 0 % (ref 0–5)
KETONES UR QL STRIP.AUTO: NEGATIVE MG/DL
LEUKOCYTE ESTERASE UR QL STRIP.AUTO: ABNORMAL
LYMPHOCYTES # BLD: 1.6 K/UL (ref 0.5–4.6)
LYMPHOCYTES NFR BLD: 34 % (ref 13–44)
MAGNESIUM SERPL-MCNC: 1.9 MG/DL (ref 1.8–2.4)
MCH RBC QN AUTO: 28.5 PG (ref 26.1–32.9)
MCHC RBC AUTO-ENTMCNC: 32.4 G/DL (ref 31.4–35)
MCV RBC AUTO: 87.8 FL (ref 82–102)
MONOCYTES # BLD: 0.4 K/UL (ref 0.1–1.3)
MONOCYTES NFR BLD: 9 % (ref 4–12)
NEUTS SEG # BLD: 2.7 K/UL (ref 1.7–8.2)
NEUTS SEG NFR BLD: 56 % (ref 43–78)
NITRITE UR QL STRIP.AUTO: NEGATIVE
NRBC # BLD: 0 K/UL (ref 0–0.2)
PH UR STRIP: 6.5 (ref 5–9)
PLATELET # BLD AUTO: 254 K/UL (ref 150–450)
PMV BLD AUTO: 9.9 FL (ref 9.4–12.3)
POTASSIUM SERPL-SCNC: 3.3 MMOL/L (ref 3.5–5.1)
PROT UR STRIP-MCNC: NEGATIVE MG/DL
RBC # BLD AUTO: 3.76 M/UL (ref 4.05–5.2)
RBC #/AREA URNS HPF: ABNORMAL /HPF
SODIUM SERPL-SCNC: 140 MMOL/L (ref 133–143)
SP GR UR REFRACTOMETRY: 1.01 (ref 1–1.02)
UROBILINOGEN UR QL STRIP.AUTO: 1 EU/DL (ref 0.2–1)
WBC # BLD AUTO: 4.8 K/UL (ref 4.3–11.1)
WBC URNS QL MICRO: ABNORMAL /HPF

## 2023-03-04 PROCEDURE — 80048 BASIC METABOLIC PNL TOTAL CA: CPT

## 2023-03-04 PROCEDURE — 81001 URINALYSIS AUTO W/SCOPE: CPT

## 2023-03-04 PROCEDURE — 36415 COLL VENOUS BLD VENIPUNCTURE: CPT

## 2023-03-04 PROCEDURE — 83735 ASSAY OF MAGNESIUM: CPT

## 2023-03-04 PROCEDURE — 2580000003 HC RX 258: Performed by: INTERNAL MEDICINE

## 2023-03-04 PROCEDURE — 6370000000 HC RX 637 (ALT 250 FOR IP): Performed by: INTERNAL MEDICINE

## 2023-03-04 PROCEDURE — 6360000002 HC RX W HCPCS: Performed by: INTERNAL MEDICINE

## 2023-03-04 PROCEDURE — 85025 COMPLETE CBC W/AUTO DIFF WBC: CPT

## 2023-03-04 PROCEDURE — 6370000000 HC RX 637 (ALT 250 FOR IP): Performed by: EMERGENCY MEDICINE

## 2023-03-04 PROCEDURE — 1100000000 HC RM PRIVATE

## 2023-03-04 PROCEDURE — 87086 URINE CULTURE/COLONY COUNT: CPT

## 2023-03-04 RX ADMIN — POTASSIUM CHLORIDE 40 MEQ: 1500 TABLET, EXTENDED RELEASE ORAL at 08:51

## 2023-03-04 RX ADMIN — CEFTRIAXONE 1000 MG: 1 INJECTION, POWDER, FOR SOLUTION INTRAMUSCULAR; INTRAVENOUS at 11:08

## 2023-03-04 RX ADMIN — TRIAMCINOLONE ACETONIDE: 1 CREAM TOPICAL at 09:28

## 2023-03-04 RX ADMIN — HYDROXYZINE PAMOATE 25 MG: 25 CAPSULE ORAL at 21:57

## 2023-03-04 RX ADMIN — SODIUM CHLORIDE: 9 INJECTION, SOLUTION INTRAVENOUS at 11:09

## 2023-03-04 RX ADMIN — TRAZODONE HYDROCHLORIDE 50 MG: 50 TABLET ORAL at 20:52

## 2023-03-04 RX ADMIN — TRIAMCINOLONE ACETONIDE: 1 CREAM TOPICAL at 21:00

## 2023-03-04 RX ADMIN — SODIUM CHLORIDE, PRESERVATIVE FREE 10 ML: 5 INJECTION INTRAVENOUS at 20:52

## 2023-03-04 RX ADMIN — CITALOPRAM HYDROBROMIDE 40 MG: 20 TABLET ORAL at 08:48

## 2023-03-04 NOTE — CARE COORDINATION
Call to USMAN DOWNING, the assigned investigator with APS. [facility] SALINA states that she is working on the case and has staffed it with her supervisor. [facility] SALINA intends to reach out to the patient's son on Monday to discuss placement options and see if he will be willing to take the patient to his home or assist her in applying for Medicaid for the patient for placement.  will also reach out to [employer] to request they also contact the patient's son to obtain information for applying for Medicaid.      Sonia Trujillo LMSW    [facility]

## 2023-03-04 NOTE — PROGRESS NOTES
syndrome--xyh-lbwebiy-ogtjovi related to psychiatric condition              Monitor off medications-admitted here involuntary commitment February 22 and no current pain meds and no significant complaints. Avoid unnecessary restart medications as likely dependence prior to hospital.     --Nicotine dependence--continue nicotine patch     --Pruritus              -- Triamcinolone cream twice daily as needed. Hydroxyzine as needed for itching  3/4--she reports effective and appreciative of cream and as needed hydroxyzine. PT/OT evals and PPD needed/ordered? Yes  Diet: ADULT DIET; Regular; 3 carb choices (45 gm/meal); Low Fat/Low Chol/High Fiber/CURT  VTE prophylaxis: Lovenox  Code status: Full Code            Hospital Problems:  Active Problems:    Dementia ([address]    Anxiety    Unable to care for self    Nicotine addiction    Chronic pain syndrome    Intermittent asthma    Pyuria    Hypokalemia  Resolved Problems:    * No resolved hospital problems. *      Objective:   Patient Vitals for the past 24 hrs:   Temp Pulse Resp BP SpO2   03/04/23 0742 98.2 °F (36.8 °C) 79 18 (!) 91/50 98 %   03/04/23 0334 98.6 °F (37 °C) 65 16 110/60 95 %   03/03/23 2356 98.8 °F (37.1 °C) 76 16 (!) 98/53 95 %   03/03/23 1954 98.6 °F (37 °C) 82 18 (!) 126/50 98 %   03/03/23 1728 -- 61 18 (!) 122/57 96 %       Oxygen Therapy  SpO2: 98 %  O2 Device: None (Room air)    Estimated body mass index is 15 kg/m² as calculated from the following:    Height as of this encounter: 5' 2\" (1.575 m). Weight as of this encounter: 82 lb (37.2 kg). No intake or output data in the 24 hours ending 03/04/23 1040      Physical Exam:     Blood pressure (!) 91/50, pulse 79, temperature 98.2 °F (36.8 °C), temperature source Oral, resp. rate 18, height 5' 2\" (1.575 m), weight 82 lb (37.2 kg), SpO2 98 %. General:    Well nourished. Head:  Normocephalic, atraumatic  Eyes:  Sclerae appear normal.  Pupils equally round.   ENT:  Nares appear normal.  Moist (GLYCOLAX) packet 17 g  17 g Oral Daily PRN    bisacodyl (DULCOLAX) suppository 10 mg  10 mg Rectal Daily PRN    famotidine (PEPCID) tablet 10 mg  10 mg Oral Daily PRN    aluminum & magnesium hydroxide-simethicone (MAALOX) 200-200-20 MG/5ML suspension 30 mL  30 mL Oral Q6H PRN    acetaminophen (TYLENOL) tablet 650 mg  650 mg Oral Q6H PRN    Or    acetaminophen (TYLENOL) suppository 650 mg  650 mg Rectal Q6H PRN    triamcinolone (KENALOG) 0.1 % cream   Topical BID    hydrOXYzine pamoate (VISTARIL) capsule 25 mg  25 mg Oral Q6H PRN    nicotine (NICODERM CQ) 14 MG/24HR 1 patch  1 patch TransDERmal Daily    traZODone (DESYREL) tablet 50 mg  50 mg Oral Nightly    citalopram (CELEXA) tablet 40 mg  40 mg Oral Daily       Signed:  Anya Lock DO    Part of this note may have been written by using a voice dictation software. The note has been proof read but may still contain some grammatical/other typographical errors.

## 2023-03-05 LAB
ANION GAP SERPL CALC-SCNC: 4 MMOL/L (ref 2–11)
BASOPHILS # BLD: 0.1 K/UL (ref 0–0.2)
BASOPHILS NFR BLD: 1 % (ref 0–2)
BUN SERPL-MCNC: 16 MG/DL (ref 8–23)
CALCIUM SERPL-MCNC: 8.9 MG/DL (ref 8.3–10.4)
CHLORIDE SERPL-SCNC: 110 MMOL/L (ref 101–110)
CO2 SERPL-SCNC: 27 MMOL/L (ref 21–32)
CREAT SERPL-MCNC: 0.72 MG/DL (ref 0.6–1)
DIFFERENTIAL METHOD BLD: ABNORMAL
EOSINOPHIL # BLD: 0.1 K/UL (ref 0–0.8)
EOSINOPHIL NFR BLD: 2 % (ref 0.5–7.8)
ERYTHROCYTE [DISTWIDTH] IN BLOOD BY AUTOMATED COUNT: 16.4 % (ref 11.9–14.6)
GLUCOSE SERPL-MCNC: 102 MG/DL (ref 65–100)
HCT VFR BLD AUTO: 33.3 % (ref 35.8–46.3)
HGB BLD-MCNC: 10.8 G/DL (ref 11.7–15.4)
IMM GRANULOCYTES # BLD AUTO: 0 K/UL (ref 0–0.5)
IMM GRANULOCYTES NFR BLD AUTO: 0 % (ref 0–5)
LYMPHOCYTES # BLD: 1.6 K/UL (ref 0.5–4.6)
LYMPHOCYTES NFR BLD: 36 % (ref 13–44)
MCH RBC QN AUTO: 28.6 PG (ref 26.1–32.9)
MCHC RBC AUTO-ENTMCNC: 32.4 G/DL (ref 31.4–35)
MCV RBC AUTO: 88.1 FL (ref 82–102)
MONOCYTES # BLD: 0.5 K/UL (ref 0.1–1.3)
MONOCYTES NFR BLD: 11 % (ref 4–12)
NEUTS SEG # BLD: 2.3 K/UL (ref 1.7–8.2)
NEUTS SEG NFR BLD: 50 % (ref 43–78)
NRBC # BLD: 0 K/UL (ref 0–0.2)
PLATELET # BLD AUTO: 256 K/UL (ref 150–450)
PMV BLD AUTO: 9.7 FL (ref 9.4–12.3)
POTASSIUM SERPL-SCNC: 3.6 MMOL/L (ref 3.5–5.1)
RBC # BLD AUTO: 3.78 M/UL (ref 4.05–5.2)
SODIUM SERPL-SCNC: 141 MMOL/L (ref 133–143)
WBC # BLD AUTO: 4.5 K/UL (ref 4.3–11.1)

## 2023-03-05 PROCEDURE — 2580000003 HC RX 258: Performed by: INTERNAL MEDICINE

## 2023-03-05 PROCEDURE — 6360000002 HC RX W HCPCS: Performed by: INTERNAL MEDICINE

## 2023-03-05 PROCEDURE — 6370000000 HC RX 637 (ALT 250 FOR IP): Performed by: INTERNAL MEDICINE

## 2023-03-05 PROCEDURE — 6370000000 HC RX 637 (ALT 250 FOR IP): Performed by: EMERGENCY MEDICINE

## 2023-03-05 PROCEDURE — 80048 BASIC METABOLIC PNL TOTAL CA: CPT

## 2023-03-05 PROCEDURE — 85025 COMPLETE CBC W/AUTO DIFF WBC: CPT

## 2023-03-05 PROCEDURE — 1100000000 HC RM PRIVATE

## 2023-03-05 PROCEDURE — 36415 COLL VENOUS BLD VENIPUNCTURE: CPT

## 2023-03-05 RX ADMIN — ACETAMINOPHEN 650 MG: 325 TABLET, FILM COATED ORAL at 20:24

## 2023-03-05 RX ADMIN — TRIAMCINOLONE ACETONIDE: 1 CREAM TOPICAL at 08:48

## 2023-03-05 RX ADMIN — CITALOPRAM HYDROBROMIDE 40 MG: 20 TABLET ORAL at 08:42

## 2023-03-05 RX ADMIN — HYDROXYZINE PAMOATE 25 MG: 25 CAPSULE ORAL at 20:24

## 2023-03-05 RX ADMIN — TRAZODONE HYDROCHLORIDE 50 MG: 50 TABLET ORAL at 20:24

## 2023-03-05 RX ADMIN — CEFTRIAXONE 1000 MG: 1 INJECTION, POWDER, FOR SOLUTION INTRAMUSCULAR; INTRAVENOUS at 11:21

## 2023-03-05 RX ADMIN — TRIAMCINOLONE ACETONIDE: 1 CREAM TOPICAL at 20:27

## 2023-03-05 RX ADMIN — SODIUM CHLORIDE, PRESERVATIVE FREE 10 ML: 5 INJECTION INTRAVENOUS at 20:25

## 2023-03-05 ASSESSMENT — PAIN DESCRIPTION - ONSET: ONSET: SUDDEN

## 2023-03-05 ASSESSMENT — PAIN DESCRIPTION - PAIN TYPE: TYPE: ACUTE PAIN

## 2023-03-05 ASSESSMENT — PAIN DESCRIPTION - DESCRIPTORS: DESCRIPTORS: THROBBING

## 2023-03-05 ASSESSMENT — PAIN DESCRIPTION - FREQUENCY: FREQUENCY: INTERMITTENT

## 2023-03-05 ASSESSMENT — PAIN DESCRIPTION - ORIENTATION: ORIENTATION: RIGHT

## 2023-03-05 ASSESSMENT — PAIN DESCRIPTION - LOCATION: LOCATION: EYE

## 2023-03-05 ASSESSMENT — PAIN SCALES - GENERAL: PAINLEVEL_OUTOF10: 4

## 2023-03-05 NOTE — PROGRESS NOTES
Reviewed notes for new spiritual concerns      Will continue to assess how we can best serve this family        [place].       Per notes:       Natalio Norris    SON IN LAW -  VINCENZO    FULL CODE    NO ACP    HIGH RISK FALLS        Good visit with pt    Calm    pleasant
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
## 2023-03-05 NOTE — CARE COORDINATION
Multiple consults placed to SW/CM for \"placement. \" SW/CM already involved and working closely with APS for placement in an [facility] or Memory Care as this patient does not meet STR or [facility] Psych criteria. Please see extensive CM notes for additional background information and details.      Can Leos LMSW    [facility]

## 2023-03-06 PROBLEM — E44.0 MODERATE PROTEIN-CALORIE MALNUTRITION (HCC): Status: ACTIVE | Noted: 2023-03-06

## 2023-03-06 LAB
ANION GAP SERPL CALC-SCNC: 4 MMOL/L (ref 2–11)
BASOPHILS # BLD: 0 K/UL (ref 0–0.2)
BASOPHILS NFR BLD: 1 % (ref 0–2)
BUN SERPL-MCNC: 14 MG/DL (ref 8–23)
CALCIUM SERPL-MCNC: 8.9 MG/DL (ref 8.3–10.4)
CHLORIDE SERPL-SCNC: 108 MMOL/L (ref 101–110)
CO2 SERPL-SCNC: 27 MMOL/L (ref 21–32)
CREAT SERPL-MCNC: 0.74 MG/DL (ref 0.6–1)
DIFFERENTIAL METHOD BLD: ABNORMAL
EOSINOPHIL # BLD: 0 K/UL (ref 0–0.8)
EOSINOPHIL NFR BLD: 1 % (ref 0.5–7.8)
ERYTHROCYTE [DISTWIDTH] IN BLOOD BY AUTOMATED COUNT: 16.1 % (ref 11.9–14.6)
GLUCOSE SERPL-MCNC: 112 MG/DL (ref 65–100)
HCT VFR BLD AUTO: 32.5 % (ref 35.8–46.3)
HGB BLD-MCNC: 10.7 G/DL (ref 11.7–15.4)
IMM GRANULOCYTES # BLD AUTO: 0 K/UL (ref 0–0.5)
IMM GRANULOCYTES NFR BLD AUTO: 0 % (ref 0–5)
LYMPHOCYTES # BLD: 1.6 K/UL (ref 0.5–4.6)
LYMPHOCYTES NFR BLD: 37 % (ref 13–44)
MAGNESIUM SERPL-MCNC: 2.3 MG/DL (ref 1.8–2.4)
MCH RBC QN AUTO: 29.1 PG (ref 26.1–32.9)
MCHC RBC AUTO-ENTMCNC: 32.9 G/DL (ref 31.4–35)
MCV RBC AUTO: 88.3 FL (ref 82–102)
MONOCYTES # BLD: 0.4 K/UL (ref 0.1–1.3)
MONOCYTES NFR BLD: 10 % (ref 4–12)
NEUTS SEG # BLD: 2.2 K/UL (ref 1.7–8.2)
NEUTS SEG NFR BLD: 51 % (ref 43–78)
NRBC # BLD: 0 K/UL (ref 0–0.2)
PLATELET # BLD AUTO: 250 K/UL (ref 150–450)
PMV BLD AUTO: 9.9 FL (ref 9.4–12.3)
POTASSIUM SERPL-SCNC: 3.3 MMOL/L (ref 3.5–5.1)
RBC # BLD AUTO: 3.68 M/UL (ref 4.05–5.2)
SODIUM SERPL-SCNC: 139 MMOL/L (ref 133–143)
WBC # BLD AUTO: 4.3 K/UL (ref 4.3–11.1)

## 2023-03-06 PROCEDURE — 80048 BASIC METABOLIC PNL TOTAL CA: CPT

## 2023-03-06 PROCEDURE — 1100000000 HC RM PRIVATE

## 2023-03-06 PROCEDURE — 6370000000 HC RX 637 (ALT 250 FOR IP): Performed by: EMERGENCY MEDICINE

## 2023-03-06 PROCEDURE — 6360000002 HC RX W HCPCS: Performed by: INTERNAL MEDICINE

## 2023-03-06 PROCEDURE — 36415 COLL VENOUS BLD VENIPUNCTURE: CPT

## 2023-03-06 PROCEDURE — 6370000000 HC RX 637 (ALT 250 FOR IP): Performed by: INTERNAL MEDICINE

## 2023-03-06 PROCEDURE — 2580000003 HC RX 258: Performed by: INTERNAL MEDICINE

## 2023-03-06 PROCEDURE — 2500000003 HC RX 250 WO HCPCS: Performed by: INTERNAL MEDICINE

## 2023-03-06 PROCEDURE — 85025 COMPLETE CBC W/AUTO DIFF WBC: CPT

## 2023-03-06 PROCEDURE — 83735 ASSAY OF MAGNESIUM: CPT

## 2023-03-06 RX ORDER — POTASSIUM CHLORIDE 20 MEQ/1
20 TABLET, EXTENDED RELEASE ORAL ONCE
Status: COMPLETED | OUTPATIENT
Start: 2023-03-06 | End: 2023-03-06

## 2023-03-06 RX ADMIN — SODIUM CHLORIDE, PRESERVATIVE FREE 10 ML: 5 INJECTION INTRAVENOUS at 08:25

## 2023-03-06 RX ADMIN — POTASSIUM CHLORIDE 20 MEQ: 1500 TABLET, EXTENDED RELEASE ORAL at 14:03

## 2023-03-06 RX ADMIN — ACETAMINOPHEN 650 MG: 325 TABLET, FILM COATED ORAL at 20:22

## 2023-03-06 RX ADMIN — CITALOPRAM HYDROBROMIDE 40 MG: 20 TABLET ORAL at 08:24

## 2023-03-06 RX ADMIN — TRAZODONE HYDROCHLORIDE 50 MG: 50 TABLET ORAL at 20:22

## 2023-03-06 RX ADMIN — CEFTRIAXONE 1000 MG: 1 INJECTION, POWDER, FOR SOLUTION INTRAMUSCULAR; INTRAVENOUS at 11:00

## 2023-03-06 RX ADMIN — POTASSIUM CHLORIDE 40 MEQ: 1500 TABLET, EXTENDED RELEASE ORAL at 10:21

## 2023-03-06 RX ADMIN — TUBERCULIN PURIFIED PROTEIN DERIVATIVE 5 UNITS: 5 INJECTION, SOLUTION INTRADERMAL at 11:07

## 2023-03-06 RX ADMIN — HYDROXYZINE PAMOATE 25 MG: 25 CAPSULE ORAL at 20:22

## 2023-03-06 RX ADMIN — TRIAMCINOLONE ACETONIDE: 1 CREAM TOPICAL at 08:25

## 2023-03-06 RX ADMIN — TRIAMCINOLONE ACETONIDE: 1 CREAM TOPICAL at 20:19

## 2023-03-06 ASSESSMENT — PAIN DESCRIPTION - LOCATION: LOCATION: HEAD

## 2023-03-06 ASSESSMENT — PAIN SCALES - GENERAL
PAINLEVEL_OUTOF10: 3
PAINLEVEL_OUTOF10: 0

## 2023-03-06 ASSESSMENT — PAIN DESCRIPTION - DESCRIPTORS: DESCRIPTORS: ACHING;DISCOMFORT

## 2023-03-06 NOTE — CARE COORDINATION
Pt now on Medical floor. I met with pt who is laying in bed watching TV. She is alert and orientated x 3. Pt states she is now homeless and needs a place to go. Bags of clothes in her room which she says are clean. She has a cell phone charging but is not sure how the bill gets paid. Patient would love to go to [facility] where her  went but I explained this is a snf community that has NH and pvt pay for assisted living. Pt states she does not have any money. She is aware that she gets confused and is happy to anywhere we find a placed. I attempted to call her APS -  Betty Doty  (cell - [phone]; office [phone]). But no answer, Cell VM full could not leave a msg but left VM on her office #. We have placed a PPD in case needed. Will follow.
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
## 2023-03-06 NOTE — PROGRESS NOTES
Comprehensive Nutrition Assessment    Type and Reason for Visit: Initial, RD Nutrition Re-Screen/LOS  Length of Stay    Nutrition Recommendations/Plan:   Meals and Snacks:  Diet: Continue current order  Nutrition Supplement Therapy:  Medical food supplement therapy:  Initiate Ensure Enlive once per day (this provides 350 kcal and 20 grams protein per bottle)  Measured weight. Malnutrition Assessment:  Malnutrition Status: Moderate malnutrition  Context: Social/Environmental Circumstances  Findings of clinical characteristics of malnutrition:   Energy Intake:  Less than 75% estimated energy requirements for 3 months or longer (Per physician notes over past 6 months)  Weight Loss:  Mild weight loss (specify amount and time period) (lost 7.2% in 6 months. Pt wants to be <90#)     Body Fat Loss:  No significant body fat loss     Muscle Mass Loss:  Mild muscle mass loss (moderate) Temples (temporalis), Clavicles (pectoralis & deltoids), Scapula (trapezius)  Fluid Accumulation:  No significant fluid accumulation     Strength:  Not Performed     Nutrition Assessment:  Nutrition History: Pt alone in room- noted to have memory problems. Stated she has been eating all she likes and hasn't had a weight change recently- weight history at PCP shows 84-88#. Do You Have Any Cultural, Confucianist, or Ethnic Food Preferences?: No   Nutrition Background:       Admitted with altered mental status. PMH Dementia and having difficulty caring for self in her apartment, hx alcohol and pain medication dependence, hx disordered eating behavior per provider noted at [facility]. Moved to ortho unit 2 days ago from ER. Nutrition Interval:  Spoke with patient- no family in room. Patient agreeable to one chocolate ONS per day at supper. Feel she is eating well, note she saved half her breakfast for later. Per PCP notes, patient forgets to eat and gives food she cooks/bakes away.       Current Nutrition Therapies:  ADULT DIET; Regular; no pepper    Current Intake:   Average Meal Intake: 26-50% (per pt. She saved some for later) Average Supplements Intake: None Ordered      Anthropometric Measures:  Height: 5' 2\" (157.5 cm)  Current Body Wt: 82 lb (37.2 kg), Weight source: Not Specified  BMI: 15, Underweight (BMI less than 22) age over 72  Admission Body Weight: 82 lb (37.2 kg) (stated)  Ideal Body Weight (Kg) (Calculated): 50 kg (110 lbs), 74.5 %  Usual Body Wt: 88 lb 6.4 oz (40.1 kg) (9/13/22 office visit), Percent weight change: -7.2       BMI Category Underweight (BMI less than 22) age over 72  Estimated Daily Nutrient Needs:  Energy (kcal/day): 7863-1049 (Kcal/kg (30-35) Weight used: 37.2 kg Current  Protein (g/day): 37-45 g (1-1.2 g/kg) Weight Used: (Current) 37.2 kg  Fluid (ml/day):   (1 ml/kcal)    Nutrition Diagnosis:   Underweight related to inadequate protein-energy intake (forgetful, lives alone) as evidenced by BMI, weight loss  Moderate malnutrition, In context of social or environmental circumstances related to inadequate protein-energy intake as evidenced by Criteria as identified in malnutrition assessment  Nutrition Interventions:   Food and/or Nutrient Delivery: Continue Current Diet, Start Oral Nutrition Supplement  Nutrition Education/Counseling: Survival skills/brief education completed (Encouraged ONS at home.)  Coordination of Nutrition Care: Continue to monitor while inpatient, Interdisciplinary Rounds       Goals:       Active Goal: Meet at least 75% of estimated needs, by next RD assessment       Nutrition Monitoring and Evaluation:      Food/Nutrient Intake Outcomes: Food and Nutrient Intake, Supplement Intake  Physical Signs/Symptoms Outcomes: Meal Time Behavior, Weight    Discharge Planning:    Continue Oral Nutrition Supplement    Sallie Lubin RD

## 2023-03-07 LAB
ANION GAP SERPL CALC-SCNC: 8 MMOL/L (ref 2–11)
BACTERIA SPEC CULT: NORMAL
BUN SERPL-MCNC: 13 MG/DL (ref 8–23)
CALCIUM SERPL-MCNC: 9 MG/DL (ref 8.3–10.4)
CHLORIDE SERPL-SCNC: 106 MMOL/L (ref 101–110)
CO2 SERPL-SCNC: 24 MMOL/L (ref 21–32)
CREAT SERPL-MCNC: 0.83 MG/DL (ref 0.6–1)
GLUCOSE SERPL-MCNC: 143 MG/DL (ref 65–100)
MAGNESIUM SERPL-MCNC: 2 MG/DL (ref 1.8–2.4)
POTASSIUM SERPL-SCNC: 3.5 MMOL/L (ref 3.5–5.1)
SERVICE CMNT-IMP: NORMAL
SODIUM SERPL-SCNC: 138 MMOL/L (ref 133–143)

## 2023-03-07 PROCEDURE — 6370000000 HC RX 637 (ALT 250 FOR IP): Performed by: EMERGENCY MEDICINE

## 2023-03-07 PROCEDURE — 1100000000 HC RM PRIVATE

## 2023-03-07 PROCEDURE — 97535 SELF CARE MNGMENT TRAINING: CPT

## 2023-03-07 PROCEDURE — 83735 ASSAY OF MAGNESIUM: CPT

## 2023-03-07 PROCEDURE — 80048 BASIC METABOLIC PNL TOTAL CA: CPT

## 2023-03-07 PROCEDURE — 6370000000 HC RX 637 (ALT 250 FOR IP): Performed by: INTERNAL MEDICINE

## 2023-03-07 PROCEDURE — 6360000002 HC RX W HCPCS: Performed by: INTERNAL MEDICINE

## 2023-03-07 PROCEDURE — 97165 OT EVAL LOW COMPLEX 30 MIN: CPT

## 2023-03-07 PROCEDURE — 2580000003 HC RX 258: Performed by: INTERNAL MEDICINE

## 2023-03-07 PROCEDURE — 36415 COLL VENOUS BLD VENIPUNCTURE: CPT

## 2023-03-07 RX ADMIN — ACETAMINOPHEN 650 MG: 325 TABLET, FILM COATED ORAL at 16:16

## 2023-03-07 RX ADMIN — HYDROXYZINE PAMOATE 25 MG: 25 CAPSULE ORAL at 19:53

## 2023-03-07 RX ADMIN — POTASSIUM CHLORIDE 40 MEQ: 1500 TABLET, EXTENDED RELEASE ORAL at 06:10

## 2023-03-07 RX ADMIN — TRAZODONE HYDROCHLORIDE 50 MG: 50 TABLET ORAL at 19:53

## 2023-03-07 RX ADMIN — HYDROXYZINE PAMOATE 25 MG: 25 CAPSULE ORAL at 09:42

## 2023-03-07 RX ADMIN — SODIUM CHLORIDE, PRESERVATIVE FREE 10 ML: 5 INJECTION INTRAVENOUS at 19:54

## 2023-03-07 RX ADMIN — CITALOPRAM HYDROBROMIDE 40 MG: 20 TABLET ORAL at 09:05

## 2023-03-07 RX ADMIN — CEFTRIAXONE 1000 MG: 1 INJECTION, POWDER, FOR SOLUTION INTRAMUSCULAR; INTRAVENOUS at 11:54

## 2023-03-07 RX ADMIN — TRIAMCINOLONE ACETONIDE: 1 CREAM TOPICAL at 19:55

## 2023-03-07 NOTE — PROGRESS NOTES
PT note:  Patient evaluated in the ER and found to be independent with mobility with no therapy needs. PT re-consulted. Patient observed to be up ad enrico in her room and the hallway with no mobility deficits/concerns. Patient has no therapy needs.     Claire Atwood, PT
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
## 2023-03-07 NOTE — PROGRESS NOTES
Patient is requesting a home medication called \"primatene\" She says it helps her stay awake. Medication is not listed on her record. Call placed to her [facility] [place] and they state she does not have a prescription for it but it is an over the counter medication.
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
## 2023-03-08 PROBLEM — R82.81 PYURIA: Status: RESOLVED | Noted: 2023-03-04 | Resolved: 2023-03-08

## 2023-03-08 PROCEDURE — 6370000000 HC RX 637 (ALT 250 FOR IP): Performed by: EMERGENCY MEDICINE

## 2023-03-08 PROCEDURE — 6360000002 HC RX W HCPCS: Performed by: INTERNAL MEDICINE

## 2023-03-08 PROCEDURE — 2580000003 HC RX 258: Performed by: INTERNAL MEDICINE

## 2023-03-08 PROCEDURE — 6370000000 HC RX 637 (ALT 250 FOR IP): Performed by: INTERNAL MEDICINE

## 2023-03-08 PROCEDURE — 1100000000 HC RM PRIVATE

## 2023-03-08 RX ADMIN — ACETAMINOPHEN 650 MG: 325 TABLET, FILM COATED ORAL at 20:23

## 2023-03-08 RX ADMIN — SODIUM CHLORIDE, PRESERVATIVE FREE 10 ML: 5 INJECTION INTRAVENOUS at 20:24

## 2023-03-08 RX ADMIN — TRIAMCINOLONE ACETONIDE: 1 CREAM TOPICAL at 09:17

## 2023-03-08 RX ADMIN — CITALOPRAM HYDROBROMIDE 40 MG: 20 TABLET ORAL at 09:17

## 2023-03-08 RX ADMIN — ACETAMINOPHEN 650 MG: 325 TABLET, FILM COATED ORAL at 09:20

## 2023-03-08 RX ADMIN — TRAZODONE HYDROCHLORIDE 50 MG: 50 TABLET ORAL at 20:23

## 2023-03-08 RX ADMIN — HYDROXYZINE PAMOATE 25 MG: 25 CAPSULE ORAL at 09:20

## 2023-03-08 RX ADMIN — CEFTRIAXONE 1000 MG: 1 INJECTION, POWDER, FOR SOLUTION INTRAMUSCULAR; INTRAVENOUS at 11:51

## 2023-03-08 RX ADMIN — TRIAMCINOLONE ACETONIDE: 1 CREAM TOPICAL at 20:25

## 2023-03-08 ASSESSMENT — PAIN SCALES - GENERAL
PAINLEVEL_OUTOF10: 3
PAINLEVEL_OUTOF10: 3
PAINLEVEL_OUTOF10: 5

## 2023-03-08 ASSESSMENT — PAIN DESCRIPTION - LOCATION
LOCATION: BACK
LOCATION: HEAD

## 2023-03-08 NOTE — PROGRESS NOTES
chloride flush 0.9 % injection 5-40 mL  5-40 mL IntraVENous 2 times per day    sodium chloride flush 0.9 % injection 5-40 mL  5-40 mL IntraVENous PRN    0.9 % sodium chloride infusion   IntraVENous PRN    potassium chloride (KLOR-CON M) extended release tablet 40 mEq  40 mEq Oral PRN    Or    potassium bicarb-citric acid (EFFER-K) effervescent tablet 40 mEq  40 mEq Oral PRN    Or    potassium chloride 10 mEq/100 mL IVPB (Peripheral Line)  10 mEq IntraVENous PRN    potassium chloride 10 mEq/100 mL IVPB (Peripheral Line)  10 mEq IntraVENous PRN    magnesium sulfate 2000 mg in 50 mL IVPB premix  2,000 mg IntraVENous PRN    ondansetron (ZOFRAN-ODT) disintegrating tablet 4 mg  4 mg Oral Q8H PRN    Or    ondansetron (ZOFRAN) injection 4 mg  4 mg IntraVENous Q6H PRN    polyethylene glycol (GLYCOLAX) packet 17 g  17 g Oral Daily PRN    bisacodyl (DULCOLAX) suppository 10 mg  10 mg Rectal Daily PRN    famotidine (PEPCID) tablet 10 mg  10 mg Oral Daily PRN    aluminum & magnesium hydroxide-simethicone (MAALOX) 200-200-20 MG/5ML suspension 30 mL  30 mL Oral Q6H PRN    acetaminophen (TYLENOL) tablet 650 mg  650 mg Oral Q6H PRN    Or    acetaminophen (TYLENOL) suppository 650 mg  650 mg Rectal Q6H PRN    triamcinolone (KENALOG) 0.1 % cream   Topical BID    hydrOXYzine pamoate (VISTARIL) capsule 25 mg  25 mg Oral Q6H PRN    nicotine (NICODERM CQ) 14 MG/24HR 1 patch  1 patch TransDERmal Daily    traZODone (DESYREL) tablet 50 mg  50 mg Oral Nightly    citalopram (CELEXA) tablet 40 mg  40 mg Oral Daily       Signed:  Valarie Sanders MD

## 2023-03-08 NOTE — PROGRESS NOTES
Physician Progress Note      Dion Simpson  [facility] #:                  [id number]  :                       1950  ADMIT DATE:       2023 3:00 PM  [address] [place] DATE:  RESPONDING  PROVIDER #:        Azul Landis MD          QUERY TEXT:    Pt admitted with severe dementia. Pt noted to have UTI. If possible, please   document in the progress notes and discharge summary if you are evaluating   and/or treating any of the following: The medical record reflects the following:  Risk Factors: 72 YOF,  Clinical Indicators: in ED Urinalysis negative for infection. afebrile, no WBC   elevation  3/3 HP:  She denies any dysuria. 3/4 UA: Mod leukocytes,  WBCs, negative bacteria  3/4 UA cx: 50,000-100,000 COLONIES/mL MIXED SKIN KELLY ISOLATED [2]  3/7 PN: She denied any dysuria. Treatment: Monitoring, 1000 CC NS IV Bolus, IV Rocephin,    Thank you,  Oscar Cooper RN,C BSN  Clinical   [email or web address]  Options provided:  -- Urinary Tract Infection (UTI)  -- Urinary Tract Infection (UTI), after study has been ruled out  -- Bacteriuria  -- Other - I will add my own diagnosis  -- Disagree - Not applicable / Not valid  -- Disagree - Clinically unable to determine / Unknown  -- Refer to Clinical Documentation Reviewer    PROVIDER RESPONSE TEXT:    This patient has a UTI, after study has been ruled out. Query created by:  Racheal Moreno on 3/7/2023 4:26 PM      Electronically signed by:  Azul Landis MD 3/8/2023 7:34 AM

## 2023-03-09 PROCEDURE — 6370000000 HC RX 637 (ALT 250 FOR IP): Performed by: EMERGENCY MEDICINE

## 2023-03-09 PROCEDURE — 6370000000 HC RX 637 (ALT 250 FOR IP): Performed by: INTERNAL MEDICINE

## 2023-03-09 PROCEDURE — 2580000003 HC RX 258: Performed by: INTERNAL MEDICINE

## 2023-03-09 PROCEDURE — 6360000002 HC RX W HCPCS: Performed by: INTERNAL MEDICINE

## 2023-03-09 PROCEDURE — 1100000000 HC RM PRIVATE

## 2023-03-09 RX ORDER — TRAMADOL HYDROCHLORIDE 50 MG/1
25 TABLET ORAL DAILY PRN
Status: DISCONTINUED | OUTPATIENT
Start: 2023-03-09 | End: 2023-03-22

## 2023-03-09 RX ORDER — TRAZODONE HYDROCHLORIDE 50 MG/1
150 TABLET ORAL NIGHTLY
Status: DISCONTINUED | OUTPATIENT
Start: 2023-03-09 | End: 2023-03-09

## 2023-03-09 RX ORDER — CITALOPRAM 20 MG/1
40 TABLET ORAL DAILY
Status: DISCONTINUED | OUTPATIENT
Start: 2023-03-10 | End: 2023-03-24 | Stop reason: HOSPADM

## 2023-03-09 RX ADMIN — TRIAMCINOLONE ACETONIDE: 1 CREAM TOPICAL at 19:39

## 2023-03-09 RX ADMIN — CEFTRIAXONE 1000 MG: 1 INJECTION, POWDER, FOR SOLUTION INTRAMUSCULAR; INTRAVENOUS at 10:31

## 2023-03-09 RX ADMIN — ACETAMINOPHEN 650 MG: 325 TABLET, FILM COATED ORAL at 17:56

## 2023-03-09 RX ADMIN — ACETAMINOPHEN 650 MG: 325 TABLET, FILM COATED ORAL at 11:01

## 2023-03-09 RX ADMIN — SODIUM CHLORIDE, PRESERVATIVE FREE 10 ML: 5 INJECTION INTRAVENOUS at 08:52

## 2023-03-09 RX ADMIN — CITALOPRAM HYDROBROMIDE 40 MG: 20 TABLET ORAL at 08:52

## 2023-03-09 RX ADMIN — TRIAMCINOLONE ACETONIDE: 1 CREAM TOPICAL at 08:52

## 2023-03-09 RX ADMIN — TRAZODONE HYDROCHLORIDE 50 MG: 50 TABLET ORAL at 19:40

## 2023-03-09 RX ADMIN — HYDROXYZINE PAMOATE 25 MG: 25 CAPSULE ORAL at 20:22

## 2023-03-09 ASSESSMENT — PAIN DESCRIPTION - DESCRIPTORS: DESCRIPTORS: ACHING

## 2023-03-09 ASSESSMENT — PAIN DESCRIPTION - LOCATION: LOCATION: BACK

## 2023-03-09 ASSESSMENT — PAIN SCALES - GENERAL: PAINLEVEL_OUTOF10: 5
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
## 2023-03-09 NOTE — PROGRESS NOTES
Jossy Mendoza [phone] or [phone]    Subjective & 24hr Events (03/09/23):   Remains pleasant while awaiting placement. Reports back pain. Plan discussed with nurse and . Assessment & Plan:   Dementia  APC investigation underway  -Consult case management    Urinary tract infection  Resolved    Nicotine dependence  -Nicotine patch    Hypokalemia  -Replete, recheck    Moderate protein-calorie malnutrition   -Nutrition consult      PT/OT evals and PPD needed/ordered? Yes  Diet:  ADULT DIET; Regular; no pepper  ADULT ORAL NUTRITION SUPPLEMENT; Dinner, Breakfast, Lunch; Standard High Calorie/High Protein Oral Supplement  VTE prophylaxis: SCD's   Code status: Full Code    Hospital Problems:  Active Problems:    Dementia ([address]    Anxiety    Unable to care for self    Nicotine addiction    Chronic pain syndrome    Intermittent asthma    Hypokalemia    Moderate protein-calorie malnutrition (HCC)  Resolved Problems:    Pyuria      Objective:   Patient Vitals for the past 24 hrs:   Temp Pulse Resp BP SpO2   03/09/23 1145 98 °F (36.7 °C) 81 20 124/64 100 %   03/09/23 0715 98 °F (36.7 °C) 79 18 (!) 94/52 93 %   03/09/23 0303 97.5 °F (36.4 °C) 73 18 122/61 96 %   03/08/23 2317 97.7 °F (36.5 °C) 71 18 (!) 120/57 97 %   03/08/23 1928 97.7 °F (36.5 °C) 88 18 132/72 99 %   03/08/23 1513 99.1 °F (37.3 °C) 86 16 128/65 97 %       Oxygen Therapy  SpO2: 100 %  O2 Device: None (Room air)    Estimated body mass index is 15 kg/m² as calculated from the following:    Height as of this encounter: 5' 2\" (1.575 m). Weight as of this encounter: 82 lb (37.2 kg). No intake or output data in the 24 hours ending 03/09/23 1422      Blood pressure 124/64, pulse 81, temperature 98 °F (36.7 °C), resp. rate 20, height 5' 2\" (1.575 m), weight 82 lb (37.2 kg), SpO2 100 %. Physical Exam  Vitals and nursing note reviewed. Constitutional:       General: She is not in acute distress. Appearance: She is underweight.  She is

## 2023-03-09 NOTE — DISCHARGE INSTR - DIET
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
## 2023-03-10 PROBLEM — E87.6 HYPOKALEMIA: Status: RESOLVED | Noted: 2023-03-04 | Resolved: 2023-03-10

## 2023-03-10 PROCEDURE — 6370000000 HC RX 637 (ALT 250 FOR IP): Performed by: FAMILY MEDICINE

## 2023-03-10 PROCEDURE — 6370000000 HC RX 637 (ALT 250 FOR IP): Performed by: INTERNAL MEDICINE

## 2023-03-10 PROCEDURE — 1100000000 HC RM PRIVATE

## 2023-03-10 PROCEDURE — 6370000000 HC RX 637 (ALT 250 FOR IP): Performed by: EMERGENCY MEDICINE

## 2023-03-10 RX ORDER — FLUTICASONE PROPIONATE 50 MCG
1 SPRAY, SUSPENSION (ML) NASAL DAILY
Status: DISCONTINUED | OUTPATIENT
Start: 2023-03-10 | End: 2023-03-24 | Stop reason: HOSPADM

## 2023-03-10 RX ADMIN — TRAZODONE HYDROCHLORIDE 50 MG: 50 TABLET ORAL at 20:41

## 2023-03-10 RX ADMIN — FLUTICASONE PROPIONATE 1 SPRAY: 50 SPRAY, METERED NASAL at 13:33

## 2023-03-10 RX ADMIN — CITALOPRAM HYDROBROMIDE 40 MG: 20 TABLET ORAL at 09:24

## 2023-03-10 RX ADMIN — HYDROXYZINE PAMOATE 25 MG: 25 CAPSULE ORAL at 20:41

## 2023-03-10 RX ADMIN — NYSTATIN 15 ML: 100000 SUSPENSION ORAL at 20:01
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
## 2023-03-11 PROBLEM — F51.04 PSYCHOPHYSIOLOGIC INSOMNIA: Status: ACTIVE | Noted: 2023-03-11

## 2023-03-11 PROCEDURE — 6370000000 HC RX 637 (ALT 250 FOR IP): Performed by: FAMILY MEDICINE

## 2023-03-11 PROCEDURE — 6370000000 HC RX 637 (ALT 250 FOR IP): Performed by: EMERGENCY MEDICINE

## 2023-03-11 PROCEDURE — 1100000000 HC RM PRIVATE

## 2023-03-11 PROCEDURE — 6370000000 HC RX 637 (ALT 250 FOR IP): Performed by: INTERNAL MEDICINE

## 2023-03-11 RX ORDER — TRAZODONE HYDROCHLORIDE 50 MG/1
100 TABLET ORAL NIGHTLY
Status: DISCONTINUED | OUTPATIENT
Start: 2023-03-11 | End: 2023-03-24 | Stop reason: HOSPADM

## 2023-03-11 RX ADMIN — CITALOPRAM HYDROBROMIDE 40 MG: 20 TABLET ORAL at 08:55

## 2023-03-11 RX ADMIN — TRIAMCINOLONE ACETONIDE: 1 CREAM TOPICAL at 20:15

## 2023-03-11 RX ADMIN — FLUTICASONE PROPIONATE 1 SPRAY: 50 SPRAY, METERED NASAL at 08:58

## 2023-03-11 RX ADMIN — NYSTATIN 15 ML: 100000 SUSPENSION ORAL at 03:07

## 2023-03-11 RX ADMIN — HYDROXYZINE PAMOATE 25 MG: 25 CAPSULE ORAL at 20:15

## 2023-03-11 RX ADMIN — NYSTATIN 15 ML: 100000 SUSPENSION ORAL at 10:44

## 2023-03-11 RX ADMIN — TRAZODONE HYDROCHLORIDE 100 MG: 50 TABLET ORAL at 20:12

## 2023-03-11 RX ADMIN — ACETAMINOPHEN 650 MG: 325 TABLET, FILM COATED ORAL at 14:30

## 2023-03-11 RX ADMIN — TRIAMCINOLONE ACETONIDE: 1 CREAM TOPICAL at 08:59

## 2023-03-11 ASSESSMENT — PAIN SCALES - GENERAL
PAINLEVEL_OUTOF10: 3
PAINLEVEL_OUTOF10: 0

## 2023-03-11 ASSESSMENT — PAIN DESCRIPTION - LOCATION: LOCATION: BACK;EYE

## 2023-03-11 NOTE — PROGRESS NOTES
Jossy Mendoza [phone] or [phone]    Subjective & 24hr Events (03/11/23):   Remains pleasant while awaiting placement. Reports not sleeping well. Increased trazodone.  Plan discussed with nurse and .     Assessment & Plan:   Dementia, unable to care for self  APC investigation underway  -Consult case management    Psychophysiologic insomnia  -trazodone    Nicotine dependence  -Nicotine patch    Moderate protein-calorie malnutrition   -Nutrition consult      PT/OT evals and PPD needed/ordered?  Yes  Diet:  ADULT DIET; Regular; no pepper  ADULT ORAL NUTRITION SUPPLEMENT; Dinner, Breakfast, Lunch; Standard High Calorie/High Protein Oral Supplement  VTE prophylaxis: SCD's   Code status: Full Code    Hospital Problems:  Principal Problem:    Unable to care for self  Active Problems:    Dementia (HCC)    Anxiety    Nicotine addiction    Chronic pain syndrome    Intermittent asthma    Moderate protein-calorie malnutrition (HCC)  Resolved Problems:    Pyuria    Hypokalemia      Objective:   Patient Vitals for the past 24 hrs:   Temp Pulse Resp BP SpO2   03/11/23 0713 98.2 °F (36.8 °C) 76 18 125/62 100 %   03/11/23 0032 98.1 °F (36.7 °C) 76 16 99/60 97 %   03/10/23 2043 97.9 °F (36.6 °C) 83 16 (!) 139/48 98 %   03/10/23 1455 98.6 °F (37 °C) 91 16 126/63 100 %   03/10/23 1133 98.2 °F (36.8 °C) 84 18 (!) 126/58 99 %         Oxygen Therapy  SpO2: 100 %  O2 Device: None (Room air)    Estimated body mass index is 15 kg/m² as calculated from the following:    Height as of this encounter: 5' 2\" (1.575 m).    Weight as of this encounter: 82 lb (37.2 kg).  No intake or output data in the 24 hours ending 03/11/23 0742        Blood pressure 125/62, pulse 76, temperature 98.2 °F (36.8 °C), temperature source Oral, resp. rate 18, height 5' 2\" (1.575 m), weight 82 lb (37.2 kg), SpO2 100 %.  Physical Exam  Vitals and nursing note reviewed.   Constitutional:       General: She is not in acute distress.      Appearance: She is underweight. She is not ill-appearing or diaphoretic. Eyes:      Extraocular Movements: Extraocular movements intact. Cardiovascular:      Rate and Rhythm: Normal rate. Pulmonary:      Effort: Pulmonary effort is normal. No respiratory distress. Abdominal:      General: There is no distension. Musculoskeletal:         General: No deformity. Skin:     Coloration: Skin is not jaundiced or pale. Neurological:      General: No focal deficit present. Mental Status: She is alert and oriented to person, place, and time. Psychiatric:         Mood and Affect: Mood normal.         Behavior: Behavior normal.         Thought Content: Thought content does not include homicidal or suicidal ideation. I have personally reviewed labs and tests:  Recent Labs:  No results found for this or any previous visit (from the past 48 hour(s)). I have personally reviewed imaging studies:  Other Studies:  CT HEAD WO CONTRAST   Final Result   No CT evidence of acute intracranial abnormality. XR CHEST PORTABLE   Final Result   Lungs are hyperinflated but otherwise clear. The heart is normal in   size. No pneumothorax. No pleural effusions.              Current Meds:  Current Facility-Administered Medications   Medication Dose Route Frequency    fluticasone (FLONASE) 50 MCG/ACT nasal spray 1 spray  1 spray Each Nostril Daily    magic (miracle) mouthwash with nystatin  15 mL Swish & Spit PRN    citalopram (CELEXA) tablet 40 mg  40 mg Oral Daily    traMADol (ULTRAM) tablet 25 mg  25 mg Oral Daily PRN    0.9 % sodium chloride infusion   IntraVENous PRN    potassium chloride (KLOR-CON M) extended release tablet 40 mEq  40 mEq Oral PRN    Or    potassium bicarb-citric acid (EFFER-K) effervescent tablet 40 mEq  40 mEq Oral PRN    Or    potassium chloride 10 mEq/100 mL IVPB (Peripheral Line)  10 mEq IntraVENous PRN    potassium chloride 10 mEq/100 mL IVPB (Peripheral Line)  10 mEq IntraVENous

## 2023-03-12 PROCEDURE — 6370000000 HC RX 637 (ALT 250 FOR IP): Performed by: INTERNAL MEDICINE

## 2023-03-12 PROCEDURE — 1100000000 HC RM PRIVATE

## 2023-03-12 PROCEDURE — 6370000000 HC RX 637 (ALT 250 FOR IP): Performed by: EMERGENCY MEDICINE

## 2023-03-12 PROCEDURE — 6370000000 HC RX 637 (ALT 250 FOR IP): Performed by: FAMILY MEDICINE

## 2023-03-12 RX ADMIN — HYDROXYZINE PAMOATE 25 MG: 25 CAPSULE ORAL at 20:48

## 2023-03-12 RX ADMIN — ACETAMINOPHEN 650 MG: 325 TABLET, FILM COATED ORAL at 15:36

## 2023-03-12 RX ADMIN — TRIAMCINOLONE ACETONIDE: 1 CREAM TOPICAL at 09:31

## 2023-03-12 RX ADMIN — FLUTICASONE PROPIONATE 1 SPRAY: 50 SPRAY, METERED NASAL at 09:31

## 2023-03-12 RX ADMIN — CITALOPRAM HYDROBROMIDE 40 MG: 20 TABLET ORAL at 09:30

## 2023-03-12 RX ADMIN — NYSTATIN 15 ML: 100000 SUSPENSION ORAL at 15:37

## 2023-03-12 RX ADMIN — NYSTATIN 15 ML: 100000 SUSPENSION ORAL at 20:47

## 2023-03-12 RX ADMIN — TRAZODONE HYDROCHLORIDE 100 MG: 50 TABLET ORAL at 20:47

## 2023-03-12 ASSESSMENT — PAIN DESCRIPTION - LOCATION: LOCATION: HEAD

## 2023-03-12 ASSESSMENT — PAIN DESCRIPTION - ORIENTATION: ORIENTATION: MID
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
## 2023-03-13 PROCEDURE — 1100000000 HC RM PRIVATE

## 2023-03-13 PROCEDURE — 6370000000 HC RX 637 (ALT 250 FOR IP): Performed by: FAMILY MEDICINE

## 2023-03-13 PROCEDURE — 6370000000 HC RX 637 (ALT 250 FOR IP): Performed by: EMERGENCY MEDICINE

## 2023-03-13 PROCEDURE — 6370000000 HC RX 637 (ALT 250 FOR IP): Performed by: INTERNAL MEDICINE

## 2023-03-13 RX ADMIN — ACETAMINOPHEN 650 MG: 325 TABLET, FILM COATED ORAL at 15:46

## 2023-03-13 RX ADMIN — TRAZODONE HYDROCHLORIDE 100 MG: 50 TABLET ORAL at 21:34

## 2023-03-13 RX ADMIN — FLUTICASONE PROPIONATE 1 SPRAY: 50 SPRAY, METERED NASAL at 08:50

## 2023-03-13 RX ADMIN — TRIAMCINOLONE ACETONIDE: 1 CREAM TOPICAL at 08:50

## 2023-03-13 RX ADMIN — TRIAMCINOLONE ACETONIDE: 1 CREAM TOPICAL at 21:35

## 2023-03-13 RX ADMIN — CITALOPRAM HYDROBROMIDE 40 MG: 20 TABLET ORAL at 08:49

## 2023-03-13 ASSESSMENT — PAIN SCALES - GENERAL
PAINLEVEL_OUTOF10: 0
PAINLEVEL_OUTOF10: 3

## 2023-03-13 ASSESSMENT — PAIN DESCRIPTION - LOCATION: LOCATION: HEAD

## 2023-03-13 ASSESSMENT — PAIN DESCRIPTION - DESCRIPTORS: DESCRIPTORS: ACHING

## 2023-03-13 NOTE — PROGRESS NOTES
%.  Physical Exam  Vitals and nursing note reviewed. Constitutional:       General: She is not in acute distress. Appearance: She is underweight. She is not ill-appearing or diaphoretic. Eyes:      Extraocular Movements: Extraocular movements intact. Cardiovascular:      Rate and Rhythm: Normal rate. Pulmonary:      Effort: Pulmonary effort is normal. No respiratory distress. Abdominal:      General: There is no distension. Musculoskeletal:         General: No deformity. Skin:     Coloration: Skin is not jaundiced or pale. Neurological:      General: No focal deficit present. Mental Status: She is alert and oriented to person, place, and time. Psychiatric:         Mood and Affect: Mood normal.         Behavior: Behavior normal.         Thought Content: Thought content does not include homicidal or suicidal ideation. I have personally reviewed labs and tests:  Recent Labs:  No results found for this or any previous visit (from the past 48 hour(s)). I have personally reviewed imaging studies:  Other Studies:  CT HEAD WO CONTRAST   Final Result   No CT evidence of acute intracranial abnormality. XR CHEST PORTABLE   Final Result   Lungs are hyperinflated but otherwise clear. The heart is normal in   size. No pneumothorax. No pleural effusions.              Current Meds:  Current Facility-Administered Medications   Medication Dose Route Frequency    traZODone (DESYREL) tablet 100 mg  100 mg Oral Nightly    fluticasone (FLONASE) 50 MCG/ACT nasal spray 1 spray  1 spray Each Nostril Daily    magic (miracle) mouthwash with nystatin  15 mL Swish & Spit PRN    citalopram (CELEXA) tablet 40 mg  40 mg Oral Daily    traMADol (ULTRAM) tablet 25 mg  25 mg Oral Daily PRN    0.9 % sodium chloride infusion   IntraVENous PRN    potassium chloride (KLOR-CON M) extended release tablet 40 mEq  40 mEq Oral PRN    Or    potassium bicarb-citric acid (EFFER-K) effervescent tablet 40 mEq  40 mEq Oral PRN    Or    potassium chloride 10 mEq/100 mL IVPB (Peripheral Line)  10 mEq IntraVENous PRN    potassium chloride 10 mEq/100 mL IVPB (Peripheral Line)  10 mEq IntraVENous PRN    magnesium sulfate 2000 mg in 50 mL IVPB premix  2,000 mg IntraVENous PRN    ondansetron (ZOFRAN-ODT) disintegrating tablet 4 mg  4 mg Oral Q8H PRN    Or    ondansetron (ZOFRAN) injection 4 mg  4 mg IntraVENous Q6H PRN    polyethylene glycol (GLYCOLAX) packet 17 g  17 g Oral Daily PRN    bisacodyl (DULCOLAX) suppository 10 mg  10 mg Rectal Daily PRN    famotidine (PEPCID) tablet 10 mg  10 mg Oral Daily PRN    aluminum & magnesium hydroxide-simethicone (MAALOX) 200-200-20 MG/5ML suspension 30 mL  30 mL Oral Q6H PRN    acetaminophen (TYLENOL) tablet 650 mg  650 mg Oral Q6H PRN    Or    acetaminophen (TYLENOL) suppository 650 mg  650 mg Rectal Q6H PRN    triamcinolone (KENALOG) 0.1 % cream   Topical BID    hydrOXYzine pamoate (VISTARIL) capsule 25 mg  25 mg Oral Q6H PRN    nicotine (NICODERM CQ) 14 MG/24HR 1 patch  1 patch TransDERmal Daily       Signed:  Tez Travis MD

## 2023-03-13 NOTE — CARE COORDINATION
LOS 10d    Chart reviewed by [facility] and discussed in IDR. Patient medically stable for discharge, however disposition is problematic. RNCM left HIPAA compliant voicemail for Stephen Pock to return call giving us permission to work with [facility] to expedite placement at a memory care [facility]. CM awaiting return call.

## 2023-03-14 PROCEDURE — 1100000000 HC RM PRIVATE

## 2023-03-14 PROCEDURE — 6370000000 HC RX 637 (ALT 250 FOR IP): Performed by: EMERGENCY MEDICINE

## 2023-03-14 PROCEDURE — 6370000000 HC RX 637 (ALT 250 FOR IP): Performed by: FAMILY MEDICINE

## 2023-03-14 RX ADMIN — FLUTICASONE PROPIONATE 1 SPRAY: 50 SPRAY, METERED NASAL at 08:35

## 2023-03-14 RX ADMIN — TRIAMCINOLONE ACETONIDE: 1 CREAM TOPICAL at 20:48

## 2023-03-14 RX ADMIN — NYSTATIN 15 ML: 100000 SUSPENSION ORAL at 15:15

## 2023-03-14 RX ADMIN — TRAZODONE HYDROCHLORIDE 100 MG: 50 TABLET ORAL at 20:46

## 2023-03-14 RX ADMIN — TRAMADOL HYDROCHLORIDE 25 MG: 50 TABLET ORAL at 16:35

## 2023-03-14 RX ADMIN — CITALOPRAM HYDROBROMIDE 40 MG: 20 TABLET ORAL at 08:33

## 2023-03-14 ASSESSMENT — PAIN SCALES - GENERAL
PAINLEVEL_OUTOF10: 0
PAINLEVEL_OUTOF10: 8
PAINLEVEL_OUTOF10: 0

## 2023-03-14 ASSESSMENT — PAIN DESCRIPTION - LOCATION: LOCATION: EYE

## 2023-03-14 ASSESSMENT — PAIN DESCRIPTION - ORIENTATION: ORIENTATION: LEFT

## 2023-03-14 ASSESSMENT — PAIN DESCRIPTION - DESCRIPTORS: DESCRIPTORS: THROBBING

## 2023-03-14 NOTE — CARE COORDINATION
CM reached out to referral resources spoke with [facility] and they state patient would require memory care [facility] and the cost is $6000/month and few [facility] beds for memory care vs longterm. Discussed long term nursing home and she would need level of care and patient is independent ADL's, no wounds requires no assistive devices for ambulation. Was given number for Loly Khan from [facility] [phone] and she suggested checking with [facility] in [place] see if they would have bed available and they may accept medicaid pending. [facility] [phone] and they do not accept Medicaid and attempted to reach [facility] and was not able to reach anyone or leave voice mail. CM received call from [facility] and she has not spoken with the son and returned her phone calls. She states that due to case load she will not be able to assist with longterm for patient. Discussed patient returning home with family and hiring sitters and she will discuss with son. CM has also left message for son to discuss d/c options. Boarding home or group home options are limited due to patient wandering and not able to have lock down at the homes. Per Rudi Jones she will follow up in am with son and call CM back tomorrow.
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
## 2023-03-14 NOTE — CARE COORDINATION
Interdisciplinary team meeting with attending, CM, nursing, PT and nutritional services for plan of care Patient medically stable for d/c but needs [facility] for d/c. ZAIN left message with APS worker Brisa Radha [phone]([facility]) office [phone] requesting follow up as she was to reach out to son regarding patient income. ZAIN noted that notes on 3/9 patient son spoke with SW and stated income was $1400 monthly which is her only income. CM left message for son to return call. [facility] would be a good resource to assist son in finding [facility]. Will reach out to [facility] and see if anyone has bed availability. CM following.
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
## 2023-03-15 PROCEDURE — 6370000000 HC RX 637 (ALT 250 FOR IP): Performed by: FAMILY MEDICINE

## 2023-03-15 PROCEDURE — 2500000003 HC RX 250 WO HCPCS: Performed by: INTERNAL MEDICINE

## 2023-03-15 PROCEDURE — 1100000000 HC RM PRIVATE

## 2023-03-15 PROCEDURE — 6370000000 HC RX 637 (ALT 250 FOR IP): Performed by: EMERGENCY MEDICINE

## 2023-03-15 RX ADMIN — CITALOPRAM HYDROBROMIDE 40 MG: 20 TABLET ORAL at 08:43

## 2023-03-15 RX ADMIN — TUBERCULIN PURIFIED PROTEIN DERIVATIVE 5 UNITS: 5 INJECTION, SOLUTION INTRADERMAL at 15:35

## 2023-03-15 RX ADMIN — FLUTICASONE PROPIONATE 1 SPRAY: 50 SPRAY, METERED NASAL at 08:43

## 2023-03-15 RX ADMIN — TRAMADOL HYDROCHLORIDE 25 MG: 50 TABLET ORAL at 20:11

## 2023-03-15 RX ADMIN — TRAZODONE HYDROCHLORIDE 100 MG: 50 TABLET ORAL at 20:11

## 2023-03-15 RX ADMIN — NYSTATIN 15 ML: 100000 SUSPENSION ORAL at 20:10

## 2023-03-15 RX ADMIN — TRAMADOL HYDROCHLORIDE 25 MG: 50 TABLET ORAL at 10:52

## 2023-03-15 RX ADMIN — NYSTATIN 15 ML: 100000 SUSPENSION ORAL at 11:26

## 2023-03-15 RX ADMIN — TRIAMCINOLONE ACETONIDE: 1 CREAM TOPICAL at 08:45

## 2023-03-15 ASSESSMENT — PAIN DESCRIPTION - LOCATION: LOCATION: HEAD

## 2023-03-15 ASSESSMENT — PAIN SCALES - GENERAL: PAINLEVEL_OUTOF10: 6

## 2023-03-15 NOTE — CARE COORDINATION
speak with administrators, find accurate phone numbers, and obtain updated info on bed avail. Called [facility]. and [facility] in [place]. Left VM at each.  Will update in the am.   Jaswant Mason

## 2023-03-15 NOTE — PLAN OF CARE
Problem: Discharge Planning  Goal: Discharge to home or other facility with appropriate resources  3/15/2023 1227 by Silvio Haq RN  Outcome: Progressing  3/14/2023 2344 by Ellie Flores RN  Outcome: Progressing     Problem: Pain  Goal: Verbalizes/displays adequate comfort level or baseline comfort level  3/15/2023 1227 by Silvio Haq RN  Outcome: Progressing  3/14/2023 2344 by Ellie Flores RN  Outcome: Progressing     Problem: Safety - Adult  Goal: Free from fall injury  3/15/2023 1227 by Silvio Haq RN  Outcome: Progressing  3/14/2023 2344 by Ellie Flores RN  Outcome: Progressing

## 2023-03-15 NOTE — PROGRESS NOTES
seems to be under control,   She is on citalopram, trazodone. Continue this and monitor. Nicotine addiction  Plan:   Patient is on nicotine patch. Monitor. She is advised to not smoke cigarettes. Chronic pain syndrome  Plan:   On Trazodone. Monitor. I advised the patient to wean herself off of narcotics. Intermittent asthma  Plan:   No exacerbation      Moderate protein-calorie malnutrition ([address]  Plan:   Encourage eating and additional nutritional supplements. I have discussed the plan of care with patient. PT/OT evals and PPD needed/ordered? Yes  Diet:  ADULT DIET; Regular; no pepper  ADULT ORAL NUTRITION SUPPLEMENT; Dinner, Breakfast, Lunch; Standard High Calorie/High Protein Oral Supplement  VTE prophylaxis: SCD's   Code status: Full Code    Hospital Problems:  Principal Problem:    Unable to care for self  Active Problems:    Dementia ([address]    Anxiety    Nicotine addiction    Chronic pain syndrome    Intermittent asthma    Moderate protein-calorie malnutrition (HCC)    Psychophysiologic insomnia  Resolved Problems:    Pyuria    Hypokalemia      Objective:   Patient Vitals for the past 24 hrs:   Temp Pulse Resp BP SpO2   03/15/23 0809 98.1 °F (36.7 °C) 84 17 (!) 96/51 97 %   03/15/23 0332 98.2 °F (36.8 °C) 84 16 95/62 94 %   03/14/23 2330 98.6 °F (37 °C) 84 18 121/82 96 %   03/14/23 1947 98.2 °F (36.8 °C) 84 14 119/81 97 %   03/14/23 1610 97.7 °F (36.5 °C) 82 15 127/78 98 %   03/14/23 1123 97.6 °F (36.4 °C) 72 16 117/66 98 %       Oxygen Therapy  SpO2: 97 %  O2 Device: None (Room air)    Estimated body mass index is 14.5 kg/m² as calculated from the following:    Height as of this encounter: 5' 2\" (1.575 m). Weight as of this encounter: 79 lb 4.8 oz (36 kg). No intake or output data in the 24 hours ending 03/15/23 0840      Physical Exam:     Blood pressure (!) 96/51, pulse 84, temperature 98.1 °F (36.7 °C), temperature source Oral, resp.  rate 17, height 5' 2\" (1.735 mL IVPB (Peripheral Line)  10 mEq IntraVENous PRN    magnesium sulfate 2000 mg in 50 mL IVPB premix  2,000 mg IntraVENous PRN    ondansetron (ZOFRAN-ODT) disintegrating tablet 4 mg  4 mg Oral Q8H PRN    Or    ondansetron (ZOFRAN) injection 4 mg  4 mg IntraVENous Q6H PRN    polyethylene glycol (GLYCOLAX) packet 17 g  17 g Oral Daily PRN    bisacodyl (DULCOLAX) suppository 10 mg  10 mg Rectal Daily PRN    famotidine (PEPCID) tablet 10 mg  10 mg Oral Daily PRN    aluminum & magnesium hydroxide-simethicone (MAALOX) 200-200-20 MG/5ML suspension 30 mL  30 mL Oral Q6H PRN    acetaminophen (TYLENOL) tablet 650 mg  650 mg Oral Q6H PRN    Or    acetaminophen (TYLENOL) suppository 650 mg  650 mg Rectal Q6H PRN    triamcinolone (KENALOG) 0.1 % cream   Topical BID    hydrOXYzine pamoate (VISTARIL) capsule 25 mg  25 mg Oral Q6H PRN    nicotine (NICODERM CQ) 14 MG/24HR 1 patch  1 patch TransDERmal Daily       Signed:  Gaby Morales MD    Part of this note may have been written by using a voice dictation software. The note has been proof read but may still contain some grammatical/other typographical errors.

## 2023-03-15 NOTE — PLAN OF CARE
Problem: Discharge Planning  Goal: Discharge to home or other facility with appropriate resources  3/14/2023 2344 by Loyd De La Vega RN  Outcome: Progressing  3/14/2023 1034 by Prem Welch RN  Outcome: Progressing     Problem: Pain  Goal: Verbalizes/displays adequate comfort level or baseline comfort level  3/14/2023 2344 by Loyd De La Vega RN  Outcome: Progressing  3/14/2023 1034 by Prem Welch RN  Outcome: Progressing     Problem: Safety - Adult  Goal: Free from fall injury  3/14/2023 2344 by Loyd De La Vega RN  Outcome: Progressing  3/14/2023 1034 by Prem Welch RN  Outcome: Progressing

## 2023-03-16 LAB
MM INDURATION, POC: 0 MM (ref 0–5)
PPD, POC: NEGATIVE

## 2023-03-16 PROCEDURE — 1100000000 HC RM PRIVATE

## 2023-03-16 PROCEDURE — 6370000000 HC RX 637 (ALT 250 FOR IP): Performed by: FAMILY MEDICINE

## 2023-03-16 PROCEDURE — 6370000000 HC RX 637 (ALT 250 FOR IP): Performed by: INTERNAL MEDICINE

## 2023-03-16 PROCEDURE — 6370000000 HC RX 637 (ALT 250 FOR IP): Performed by: EMERGENCY MEDICINE

## 2023-03-16 RX ADMIN — FLUTICASONE PROPIONATE 1 SPRAY: 50 SPRAY, METERED NASAL at 08:34

## 2023-03-16 RX ADMIN — NYSTATIN 15 ML: 100000 SUSPENSION ORAL at 14:26

## 2023-03-16 RX ADMIN — TRAMADOL HYDROCHLORIDE 25 MG: 50 TABLET ORAL at 08:47

## 2023-03-16 RX ADMIN — CITALOPRAM HYDROBROMIDE 40 MG: 20 TABLET ORAL at 08:34

## 2023-03-16 RX ADMIN — TRAZODONE HYDROCHLORIDE 100 MG: 50 TABLET ORAL at 20:50

## 2023-03-16 RX ADMIN — TRIAMCINOLONE ACETONIDE: 1 CREAM TOPICAL at 20:50

## 2023-03-16 RX ADMIN — ACETAMINOPHEN 650 MG: 325 TABLET, FILM COATED ORAL at 11:21

## 2023-03-16 ASSESSMENT — PAIN DESCRIPTION - DESCRIPTORS: DESCRIPTORS: ACHING

## 2023-03-16 ASSESSMENT — PAIN SCALES - GENERAL
PAINLEVEL_OUTOF10: 5
PAINLEVEL_OUTOF10: 0
PAINLEVEL_OUTOF10: 6

## 2023-03-16 ASSESSMENT — PAIN DESCRIPTION - LOCATION
LOCATION: BACK;HEAD
LOCATION: ABDOMEN;BACK

## 2023-03-16 ASSESSMENT — PAIN DESCRIPTION - PAIN TYPE: TYPE: ACUTE PAIN

## 2023-03-16 ASSESSMENT — PAIN DESCRIPTION - ORIENTATION: ORIENTATION: LEFT

## 2023-03-16 ASSESSMENT — PAIN DESCRIPTION - FREQUENCY: FREQUENCY: INTERMITTENT

## 2023-03-16 NOTE — PROGRESS NOTES
regard to her arrangement for discharge. Active Problems:    Dementia ([facility]    Anxiety   Psychophysiologic insomnia  Plan:   She has deteriorating behavioral changes before admission. Now behavior seems to be under control,   She is on citalopram, trazodone. Continue this and monitor. Nicotine addiction  Plan:   Patient is on nicotine patch. Monitor. She is advised to not smoke cigarettes. Chronic pain syndrome  Plan:   On Trazodone. Monitor. I advised the patient to wean herself off of narcotics. Intermittent asthma  Plan:   No exacerbation      Moderate protein-calorie malnutrition ([address]  Plan:   Encourage eating and additional nutritional supplements. I have discussed the plan of care with patient. PT/OT evals and PPD needed/ordered? Yes  Diet:  ADULT DIET; Regular; no pepper  ADULT ORAL NUTRITION SUPPLEMENT; Dinner, Breakfast, Lunch; Standard High Calorie/High Protein Oral Supplement  VTE prophylaxis: SCD's   Code status: Full Code    Hospital Problems:  Principal Problem:    Unable to care for self  Active Problems:    Dementia ([facility]    Anxiety    Nicotine addiction    Chronic pain syndrome    Intermittent asthma    Moderate protein-calorie malnutrition (HCC)    Psychophysiologic insomnia  Resolved Problems:    Pyuria    Hypokalemia      Objective:   Patient Vitals for the past 24 hrs:   Temp Pulse Resp BP SpO2   03/16/23 0733 98.2 °F (36.8 °C) 72 14 (!) 94/52 98 %   03/16/23 0319 97.5 °F (36.4 °C) 73 18 (!) 108/52 96 %   03/15/23 2349 98.2 °F (36.8 °C) 80 16 (!) 92/51 93 %   03/15/23 1948 97.9 °F (36.6 °C) 84 16 (!) 115/59 97 %   03/15/23 1637 98.2 °F (36.8 °C) 95 17 (!) 142/65 100 %   03/15/23 1210 98.4 °F (36.9 °C) 80 17 125/67 100 %         Oxygen Therapy  SpO2: 98 %  O2 Device: None (Room air)    Estimated body mass index is 16.55 kg/m² as calculated from the following:    Height as of this encounter: 5' 2\" (1.575 m).     Weight as of this encounter: 90 lb 8 oz (41.1 kg). Intake/Output Summary (Last 24 hours) at 3/16/2023 1027  Last data filed at 3/16/2023 0830  Gross per 24 hour   Intake 240 ml   Output --   Net 240 ml           Physical Exam:     Blood pressure (!) 94/52, pulse 72, temperature 98.2 °F (36.8 °C), temperature source Oral, resp. rate 14, height 5' 2\" (1.575 m), weight 90 lb 8 oz (41.1 kg), SpO2 98 %. General:    Thin. Patient sitting up in bed and talking well. She is just finished eating breakfast.  She has no respiratory distress. She seems \". .   Head:  Normocephalic, atraumatic  Eyes:  Sclerae appear normal.  Pupils equally round. ENT:  Nares appear normal.  Moist oral mucosa  Neck:  No restricted ROM. Trachea midline   CV:   RRR. No m/r/g. No jugular venous distension. Lungs:   CTAB. No wheezing, rhonchi, or rales. Symmetric expansion. Abdomen:   Soft, nontender, nondistended. Extremities: No cyanosis or clubbing. No edema  Skin:     No rashes and normal coloration. Warm and dry. Neuro:  CN II-XII grossly intact. Psych:  Normal mood and affect. I have personally reviewed labs and tests:  Recent Labs:  No results found for this or any previous visit (from the past 48 hour(s)). I have personally reviewed imaging studies:  Other Studies:  CT HEAD WO CONTRAST   Final Result   No CT evidence of acute intracranial abnormality. XR CHEST PORTABLE   Final Result   Lungs are hyperinflated but otherwise clear. The heart is normal in   size. No pneumothorax. No pleural effusions.              Current Meds:  Current Facility-Administered Medications   Medication Dose Route Frequency    tuberculin injection 5 Units  5 Units IntraDERmal Once    traZODone (DESYREL) tablet 100 mg  100 mg Oral Nightly    fluticasone (FLONASE) 50 MCG/ACT nasal spray 1 spray  1 spray Each Nostril Daily    magic (miracle) mouthwash with nystatin  15 mL Swish & Spit PRN    citalopram (CELEXA) tablet 40 mg  40 mg Oral Daily    traMADol (ULTRAM)

## 2023-03-16 NOTE — PLAN OF CARE
Problem: Discharge Planning  Goal: Discharge to home or other facility with appropriate resources  3/16/2023 1922 by Yaakov Dumont RN  Outcome: Progressing  3/16/2023 1027 by Michelle Yeung RN  Outcome: Progressing     Problem: Pain  Goal: Verbalizes/displays adequate comfort level or baseline comfort level  3/16/2023 1922 by Yaakov Dumont RN  Outcome: Progressing  3/16/2023 1027 by Michelle Yeung RN  Outcome: Progressing     Problem: Safety - Adult  Goal: Free from fall injury  3/16/2023 1922 by Yaakov Dumont RN  Outcome: Progressing  3/16/2023 1027 by Michelle Yeung RN  Outcome: Progressing

## 2023-03-16 NOTE — CARE COORDINATION
UPDATE at 5pm 3/16/23  Sw just received a call back from Miguel Angel ([phone]). She informed pt's son Brandyn Powers is traveling and out of town for 2 weeks. He is out of reach and is not responding to text or voicemail messages. Leann Seth was checking on pt (no pay) \"out of the goodness of her heart\" as a favor to Brandyn Powers. She would make sure she had dinner and was safe in [facility] at night. The home situation became completely unmanageable. She works with Brandyn Powers at the blood center and felt very badly for him. He went bankrupt attempting to care for his mother, paying her extra bills, paying for food and medications. Son likely does not have any extra funds to help pt get into [facility]. No informed Jimmy DOES NOT HAVE POWER OF  AND SHE CASHES HER OWN CHECKS. Leann Seth also informed pt's check is being held at the apt complex front office as she has been evicted due to her wondering behaviors. No plans to go by the [facility] and get pt's check. She is more than willing to take it to facility if pt gets accepted. Informed her at this point it was the only lead Sw has as far as placement. At this point Leann Seth says to text or call and leave her voicemails and she is more than willing to assist in her placement. Sw cont to work on this situation.    Keegan Bowling

## 2023-03-16 NOTE — CARE COORDINATION
Elaine messaged and called/left vm on son Aydee Pearl cell phone. Aggressively attempting to reach pt's son in order to take advantage of bed available at [facility]. Will inform when more known. Bryce Kaba called Rosario Talamantes ([phone]) APS worker assigned to pt's case. Left vm informing I have been unable to reach pt's son for last 2 days to discuss his ability to cover the $300 cost difference between pt's check and cost of [facility] ([phone]). Called pt's friend Kyla Najjar ([phone] vm. Called pt's emergency contact Terrace Sprain ([phone]) left vm informing Sw inability to reach pt's son for 2 days. [address] with Milagros Griffin APS worker this pm. She called all the same numbers as this SW called. She left messages as well. Elaine provided her with pt's son's home address. She plans to make a visit to his home and if she doesn't find him home, she will leave him a note to respond to her and to hospital SWers. Milagros Griffin also asked if son had POA. Unclear if he does, but do not think this was done before she became unable to give him this. Elaine faxed photo ID, 24 hr PPD read, and Ins card today to Anant Rico (again reminder: communicating with this director via email as she is at home on medical leave [email or web address]) at [facility]. ([phone]  Continuing to assist with this quite difficult social situation.  Anjelica Sandoval

## 2023-03-17 LAB
MM INDURATION, POC: 0 MM (ref 0–5)
PPD, POC: NEGATIVE

## 2023-03-17 PROCEDURE — 6370000000 HC RX 637 (ALT 250 FOR IP): Performed by: EMERGENCY MEDICINE

## 2023-03-17 PROCEDURE — 6370000000 HC RX 637 (ALT 250 FOR IP): Performed by: FAMILY MEDICINE

## 2023-03-17 PROCEDURE — 6370000000 HC RX 637 (ALT 250 FOR IP): Performed by: INTERNAL MEDICINE

## 2023-03-17 PROCEDURE — 1100000000 HC RM PRIVATE

## 2023-03-17 RX ORDER — MULTIVITAMIN WITH IRON
1 TABLET ORAL DAILY
Status: DISCONTINUED | OUTPATIENT
Start: 2023-03-17 | End: 2023-03-24 | Stop reason: HOSPADM

## 2023-03-17 RX ADMIN — NYSTATIN 15 ML: 100000 SUSPENSION ORAL at 21:23

## 2023-03-17 RX ADMIN — CITALOPRAM HYDROBROMIDE 40 MG: 20 TABLET ORAL at 08:31

## 2023-03-17 RX ADMIN — TRAZODONE HYDROCHLORIDE 100 MG: 50 TABLET ORAL at 21:18

## 2023-03-17 RX ADMIN — B-COMPLEX W/ C & FOLIC ACID TAB 1 TABLET: TAB at 21:23

## 2023-03-17 RX ADMIN — TRIAMCINOLONE ACETONIDE: 1 CREAM TOPICAL at 08:29

## 2023-03-17 RX ADMIN — TRIAMCINOLONE ACETONIDE: 1 CREAM TOPICAL at 21:19

## 2023-03-17 RX ADMIN — TRAMADOL HYDROCHLORIDE 25 MG: 50 TABLET ORAL at 22:41

## 2023-03-17 RX ADMIN — FLUTICASONE PROPIONATE 1 SPRAY: 50 SPRAY, METERED NASAL at 08:30

## 2023-03-17 RX ADMIN — NYSTATIN 15 ML: 100000 SUSPENSION ORAL at 09:16

## 2023-03-17 RX ADMIN — ACETAMINOPHEN 650 MG: 325 TABLET, FILM COATED ORAL at 14:25

## 2023-03-17 ASSESSMENT — PAIN SCALES - GENERAL
PAINLEVEL_OUTOF10: 3
PAINLEVEL_OUTOF10: 4
PAINLEVEL_OUTOF10: 9
PAINLEVEL_OUTOF10: 9

## 2023-03-17 ASSESSMENT — PAIN DESCRIPTION - DESCRIPTORS: DESCRIPTORS: ACHING

## 2023-03-17 ASSESSMENT — PAIN DESCRIPTION - LOCATION
LOCATION: BACK
LOCATION: BACK

## 2023-03-17 NOTE — PROGRESS NOTES
°C) 87 18 136/71 99 %   03/16/23 1624 98.1 °F (36.7 °C) 82 17 117/61 98 %   03/16/23 1115 98.1 °F (36.7 °C) 82 13 114/64 97 %         Oxygen Therapy  SpO2: 97 %  O2 Device: None (Room air)    Estimated body mass index is 17.43 kg/m² as calculated from the following:    Height as of this encounter: 5' 2\" (1.575 m). Weight as of this encounter: 95 lb 4.8 oz (43.2 kg). Intake/Output Summary (Last 24 hours) at 3/17/2023 0918  Last data filed at 3/16/2023 1624  Gross per 24 hour   Intake 240 ml   Output --   Net 240 ml           Physical Exam:     Blood pressure 105/66, pulse 78, temperature 97.7 °F (36.5 °C), temperature source Oral, resp. rate 16, height 5' 2\" (1.575 m), weight 95 lb 4.8 oz (43.2 kg), SpO2 97 %. General:    Thin. Patient sitting up in bed and talking well. She is just finished eating breakfast.  She has no respiratory distress. She seems \". .   Head:  Normocephalic, atraumatic  Eyes:  Sclerae appear normal.  Pupils equally round. ENT:  Nares appear normal.  Moist oral mucosa  Neck:  No restricted ROM. Trachea midline   CV:   RRR. No m/r/g. No jugular venous distension. Lungs:   CTAB. No wheezing, rhonchi, or rales. Symmetric expansion. Abdomen:   Soft, nontender, nondistended. Extremities: No cyanosis or clubbing. No edema  Skin:     No rashes and normal coloration. Warm and dry. Neuro:  CN II-XII grossly intact. Psych:  Normal mood and affect. I have personally reviewed labs and tests:  Recent Labs:  Recent Results (from the past 48 hour(s))   PLEASE READ & DOCUMENT PPD TEST IN 24 HRS    Collection Time: 03/16/23 12:00 AM   Result Value Ref Range    PPD, (POC) Negative Negative    mm Induration 0 0 - 5 mm       I have personally reviewed imaging studies:  Other Studies:  CT HEAD WO CONTRAST   Final Result   No CT evidence of acute intracranial abnormality. XR CHEST PORTABLE   Final Result   Lungs are hyperinflated but otherwise clear.   The heart is normal in size.  No pneumothorax. No pleural effusions. Current Meds:  Current Facility-Administered Medications   Medication Dose Route Frequency    traZODone (DESYREL) tablet 100 mg  100 mg Oral Nightly    fluticasone (FLONASE) 50 MCG/ACT nasal spray 1 spray  1 spray Each Nostril Daily    magic (miracle) mouthwash with nystatin  15 mL Swish & Spit PRN    citalopram (CELEXA) tablet 40 mg  40 mg Oral Daily    traMADol (ULTRAM) tablet 25 mg  25 mg Oral Daily PRN    0.9 % sodium chloride infusion   IntraVENous PRN    potassium chloride (KLOR-CON M) extended release tablet 40 mEq  40 mEq Oral PRN    Or    potassium bicarb-citric acid (EFFER-K) effervescent tablet 40 mEq  40 mEq Oral PRN    Or    potassium chloride 10 mEq/100 mL IVPB (Peripheral Line)  10 mEq IntraVENous PRN    potassium chloride 10 mEq/100 mL IVPB (Peripheral Line)  10 mEq IntraVENous PRN    magnesium sulfate 2000 mg in 50 mL IVPB premix  2,000 mg IntraVENous PRN    ondansetron (ZOFRAN-ODT) disintegrating tablet 4 mg  4 mg Oral Q8H PRN    Or    ondansetron (ZOFRAN) injection 4 mg  4 mg IntraVENous Q6H PRN    polyethylene glycol (GLYCOLAX) packet 17 g  17 g Oral Daily PRN    bisacodyl (DULCOLAX) suppository 10 mg  10 mg Rectal Daily PRN    famotidine (PEPCID) tablet 10 mg  10 mg Oral Daily PRN    aluminum & magnesium hydroxide-simethicone (MAALOX) 200-200-20 MG/5ML suspension 30 mL  30 mL Oral Q6H PRN    acetaminophen (TYLENOL) tablet 650 mg  650 mg Oral Q6H PRN    Or    acetaminophen (TYLENOL) suppository 650 mg  650 mg Rectal Q6H PRN    triamcinolone (KENALOG) 0.1 % cream   Topical BID    hydrOXYzine pamoate (VISTARIL) capsule 25 mg  25 mg Oral Q6H PRN    nicotine (NICODERM CQ) 14 MG/24HR 1 patch  1 patch TransDERmal Daily       Signed:  Carlee Mejia MD    Part of this note may have been written by using a voice dictation software. The note has been proof read but may still contain some grammatical/other typographical errors.

## 2023-03-17 NOTE — CARE COORDINATION
Interdisciplinary team meeting with attending, CM, nursing, PT and nutritional services for plan of care CM continues to follow up with correction for patient. There is a facility that is reviewing information and left message to see if they needed anything else. We are waiting on benefit letter and her check and friend of patient Laney Bynum is trying to obtain information for patient and also trying to reach patient son Mary Birch who is traveling and out of town for 2 weeks and has not been responding to text or voicemail messages. CM following.
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
## 2023-03-18 PROCEDURE — 6370000000 HC RX 637 (ALT 250 FOR IP): Performed by: EMERGENCY MEDICINE

## 2023-03-18 PROCEDURE — 1100000000 HC RM PRIVATE

## 2023-03-18 PROCEDURE — 6370000000 HC RX 637 (ALT 250 FOR IP): Performed by: INTERNAL MEDICINE

## 2023-03-18 PROCEDURE — 6370000000 HC RX 637 (ALT 250 FOR IP): Performed by: FAMILY MEDICINE

## 2023-03-18 RX ADMIN — FLUTICASONE PROPIONATE 1 SPRAY: 50 SPRAY, METERED NASAL at 09:06

## 2023-03-18 RX ADMIN — B-COMPLEX W/ C & FOLIC ACID TAB 1 TABLET: TAB at 09:05

## 2023-03-18 RX ADMIN — TRAZODONE HYDROCHLORIDE 100 MG: 50 TABLET ORAL at 19:55

## 2023-03-18 RX ADMIN — NYSTATIN 15 ML: 100000 SUSPENSION ORAL at 12:53

## 2023-03-18 RX ADMIN — TRAMADOL HYDROCHLORIDE 25 MG: 50 TABLET ORAL at 21:17

## 2023-03-18 RX ADMIN — TRIAMCINOLONE ACETONIDE: 1 CREAM TOPICAL at 09:06

## 2023-03-18 RX ADMIN — NYSTATIN 15 ML: 100000 SUSPENSION ORAL at 19:55

## 2023-03-18 RX ADMIN — CITALOPRAM HYDROBROMIDE 40 MG: 20 TABLET ORAL at 09:05

## 2023-03-18 RX ADMIN — TRIAMCINOLONE ACETONIDE: 1 CREAM TOPICAL at 19:56

## 2023-03-18 ASSESSMENT — PAIN DESCRIPTION - LOCATION
LOCATION: BACK
LOCATION: BACK

## 2023-03-18 ASSESSMENT — PAIN DESCRIPTION - DESCRIPTORS: DESCRIPTORS: ACHING

## 2023-03-18 ASSESSMENT — PAIN SCALES - GENERAL
PAINLEVEL_OUTOF10: 9
PAINLEVEL_OUTOF10: 9
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
## 2023-03-18 NOTE — PLAN OF CARE
Problem: Discharge Planning  Goal: Discharge to home or other facility with appropriate resources  3/18/2023 1039 by Cathyann Schwab, RN  Outcome: Progressing  3/18/2023 0024 by Heron Bradley RN  Outcome: Progressing     Problem: Pain  Goal: Verbalizes/displays adequate comfort level or baseline comfort level  3/18/2023 1039 by Cathyann Schwab, RN  Outcome: Progressing  3/18/2023 0024 by Heron Bradley RN  Outcome: Progressing     Problem: Safety - Adult  Goal: Free from fall injury  3/18/2023 1039 by Cathyann Schwab, RN  Outcome: Progressing  3/18/2023 0024 by Heron Bradley RN  Outcome: Progressing

## 2023-03-18 NOTE — PLAN OF CARE
Problem: Discharge Planning  Goal: Discharge to home or other facility with appropriate resources  3/18/2023 0024 by Ashley Whitmore RN  Outcome: Progressing  3/17/2023 1437 by Cindy Mitchell RN  Outcome: Progressing     Problem: Pain  Goal: Verbalizes/displays adequate comfort level or baseline comfort level  3/18/2023 0024 by Ashley Whitmore RN  Outcome: Progressing  3/17/2023 1437 by Cindy Mitchell RN  Outcome: Progressing     Problem: Safety - Adult  Goal: Free from fall injury  3/18/2023 0024 by Ashley Whitmore RN  Outcome: Progressing  3/17/2023 1437 by Cindy Mitchell RN  Outcome: Progressing

## 2023-03-19 PROCEDURE — 6370000000 HC RX 637 (ALT 250 FOR IP): Performed by: EMERGENCY MEDICINE

## 2023-03-19 PROCEDURE — 1100000000 HC RM PRIVATE

## 2023-03-19 PROCEDURE — 6370000000 HC RX 637 (ALT 250 FOR IP): Performed by: INTERNAL MEDICINE

## 2023-03-19 PROCEDURE — 6370000000 HC RX 637 (ALT 250 FOR IP): Performed by: FAMILY MEDICINE

## 2023-03-19 RX ADMIN — B-COMPLEX W/ C & FOLIC ACID TAB 1 TABLET: TAB at 08:54

## 2023-03-19 RX ADMIN — TRAZODONE HYDROCHLORIDE 100 MG: 50 TABLET ORAL at 20:19

## 2023-03-19 RX ADMIN — FLUTICASONE PROPIONATE 1 SPRAY: 50 SPRAY, METERED NASAL at 08:54

## 2023-03-19 RX ADMIN — NYSTATIN 15 ML: 100000 SUSPENSION ORAL at 20:19

## 2023-03-19 RX ADMIN — TRIAMCINOLONE ACETONIDE: 1 CREAM TOPICAL at 08:54

## 2023-03-19 RX ADMIN — CITALOPRAM HYDROBROMIDE 40 MG: 20 TABLET ORAL at 08:54

## 2023-03-19 RX ADMIN — NYSTATIN 15 ML: 100000 SUSPENSION ORAL at 09:00

## 2023-03-19 RX ADMIN — TRAMADOL HYDROCHLORIDE 25 MG: 50 TABLET ORAL at 09:00

## 2023-03-19 RX ADMIN — TRIAMCINOLONE ACETONIDE: 1 CREAM TOPICAL at 20:21

## 2023-03-19 ASSESSMENT — PAIN SCALES - GENERAL
PAINLEVEL_OUTOF10: 8
PAINLEVEL_OUTOF10: 0
PAINLEVEL_OUTOF10: 0
PAINLEVEL_OUTOF10: 5

## 2023-03-19 ASSESSMENT — PAIN DESCRIPTION - LOCATION
LOCATION: BACK
LOCATION: BACK

## 2023-03-19 ASSESSMENT — PAIN DESCRIPTION - DESCRIPTORS: DESCRIPTORS: SORE;ACHING

## 2023-03-19 NOTE — PLAN OF CARE
Problem: Discharge Planning  Goal: Discharge to home or other facility with appropriate resources  3/18/2023 2256 by Johan Brown RN  Outcome: Progressing  3/18/2023 1039 by Lisy Kulkarni RN  Outcome: Progressing     Problem: Pain  Goal: Verbalizes/displays adequate comfort level or baseline comfort level  3/18/2023 2256 by Johan Brown RN  Outcome: Progressing  3/18/2023 1039 by Lisy Kulkarni RN  Outcome: Progressing     Problem: Safety - Adult  Goal: Free from fall injury  3/18/2023 2256 by Johan Brown RN  Outcome: Progressing  3/18/2023 1039 by Lisy Kulkarni RN  Outcome: Progressing

## 2023-03-19 NOTE — PROGRESS NOTES
Old Nicotine patch removed and discarded appropriately. New patch placed to RLQ per patient request and covered with [employer]eg[employer] for placement security.

## 2023-03-19 NOTE — PROGRESS NOTES
She voiced understanding and agreed to stay with current dose of trazodone. No other new complaints. 3/18/23   No new complaints. 3/19/23   No new complaints. Assessment & Plan:     Principal Problem:    Unable to care for self  Plan:   Patient seems more calm, compliant with regimen of treatment. Discharge planning is underway in regard to arrangement for discharge. There is some discrepancy in amount of money that is required to secure her stay outside of hospital.  Case management is working on that to come up with that money. Active Problems:    Dementia ([address]    Anxiety   Psychophysiologic insomnia  Plan:   She has deteriorating behavioral changes before admission. Now behavior seems to be under control,   She is on citalopram, trazodone. Continue this and monitor. Nicotine addiction  Plan:   Patient is on nicotine patch. Monitor. She is advised to not smoke cigarettes. Chronic pain syndrome  Plan:   On Trazodone. Monitor. I advised the patient to wean herself off of narcotics. Intermittent asthma  Plan:   No exacerbation      Moderate protein-calorie malnutrition ([address]  Plan:   Encourage eating and additional nutritional supplements. I have discussed the plan of care with patient. PT/OT evals and PPD needed/ordered? Yes  Diet:  ADULT DIET;  Regular; no pepper  ADULT ORAL NUTRITION SUPPLEMENT; Dinner, Breakfast, Lunch; Standard High Calorie/High Protein Oral Supplement  ADULT ORAL NUTRITION SUPPLEMENT; Dinner; Frozen Oral Supplement  VTE prophylaxis: SCD's   Code status: Full Code    Hospital Problems:  Principal Problem:    Unable to care for self  Active Problems:    Dementia ([address]    Anxiety    Nicotine addiction    Chronic pain syndrome    Intermittent asthma    Moderate protein-calorie malnutrition ([address]    Psychophysiologic insomnia  Resolved Problems:    Pyuria    Hypokalemia      Objective:   Patient Vitals for the past 24 hrs:   Temp Pulse proof read but may still contain some grammatical/other typographical errors.

## 2023-03-20 PROCEDURE — 6370000000 HC RX 637 (ALT 250 FOR IP): Performed by: FAMILY MEDICINE

## 2023-03-20 PROCEDURE — 1100000000 HC RM PRIVATE

## 2023-03-20 PROCEDURE — 6370000000 HC RX 637 (ALT 250 FOR IP): Performed by: INTERNAL MEDICINE

## 2023-03-20 PROCEDURE — 6370000000 HC RX 637 (ALT 250 FOR IP): Performed by: EMERGENCY MEDICINE

## 2023-03-20 RX ADMIN — TRIAMCINOLONE ACETONIDE: 1 CREAM TOPICAL at 21:12

## 2023-03-20 RX ADMIN — FLUTICASONE PROPIONATE 1 SPRAY: 50 SPRAY, METERED NASAL at 08:40

## 2023-03-20 RX ADMIN — TRAZODONE HYDROCHLORIDE 100 MG: 50 TABLET ORAL at 21:08

## 2023-03-20 RX ADMIN — TRAMADOL HYDROCHLORIDE 25 MG: 50 TABLET ORAL at 08:37

## 2023-03-20 RX ADMIN — ACETAMINOPHEN 650 MG: 325 TABLET, FILM COATED ORAL at 21:08

## 2023-03-20 RX ADMIN — NYSTATIN 15 ML: 100000 SUSPENSION ORAL at 14:47

## 2023-03-20 RX ADMIN — HYDROXYZINE PAMOATE 25 MG: 25 CAPSULE ORAL at 12:19

## 2023-03-20 RX ADMIN — NYSTATIN 15 ML: 100000 SUSPENSION ORAL at 18:40

## 2023-03-20 RX ADMIN — CITALOPRAM HYDROBROMIDE 40 MG: 20 TABLET ORAL at 08:38

## 2023-03-20 RX ADMIN — B-COMPLEX W/ C & FOLIC ACID TAB 1 TABLET: TAB at 08:22

## 2023-03-20 RX ADMIN — TRIAMCINOLONE ACETONIDE: 1 CREAM TOPICAL at 11:00

## 2023-03-20 RX ADMIN — ACETAMINOPHEN 650 MG: 325 TABLET, FILM COATED ORAL at 14:45

## 2023-03-20 ASSESSMENT — PAIN SCALES - GENERAL
PAINLEVEL_OUTOF10: 8
PAINLEVEL_OUTOF10: 8
PAINLEVEL_OUTOF10: 6
PAINLEVEL_OUTOF10: 9

## 2023-03-20 ASSESSMENT — PAIN DESCRIPTION - DESCRIPTORS
DESCRIPTORS: THROBBING
DESCRIPTORS: STABBING

## 2023-03-20 ASSESSMENT — PAIN DESCRIPTION - PAIN TYPE: TYPE: ACUTE PAIN

## 2023-03-20 ASSESSMENT — PAIN DESCRIPTION - ORIENTATION
ORIENTATION: LOWER
ORIENTATION: LOWER

## 2023-03-20 ASSESSMENT — PAIN DESCRIPTION - LOCATION
LOCATION: BACK
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
## 2023-03-20 NOTE — CARE COORDINATION
Interdisciplinary team meeting with attending, CM, nursing, PT and nutritional services for plan of care. CM called and spoke with Tenzin Gold at the [facility] [phone] and she states that she does not have any beds. The bed they thought would be available is not opening up as planned. CM requested she let CM know if bed becomes available and that CM will follow up with her again this week. Left message for [facility] [phone] to see if they can assist with placement. ZAIN left message for Ashley Dos Santos [facility] [phone] for Yadira Turner asking if they have needed information for patient and if they can offer a bed. The cost is $1699/month patient check is approximately $1400 a month and the facility will need the son to cover what patient check does not cover. Per Mouna Stuart [phone] friend of patient and she works with the son she states son went bankrupt caring for his mother and paying for her bills and does not likely have extra funds to help cover the cost. The patients social security check is mailed to the patients apartment and per Hazel the check is being held at the apartment complex. She states that patient son is out of town traveling for 2 weeks and has not been responding to text or voicemail. ZAIN continues to work on placement for patient. ADDENDUM: Received call back from Saleem Kingsley at [facility] in [place] and states they have a bed and would be $1321 and patient would have $79 left for personal items. CM explained would need memory care unit and she stated they would come and assess patient to see if appropriate and will send information over for the physician to complete. They will call CM with a time they will come and assess patient. CM left message for son to call and discuss options and will need her check for any snf that patient would be accepted. CM following.

## 2023-03-21 LAB
MM INDURATION, POC: 0 MM (ref 0–5)
PPD, POC: NEGATIVE

## 2023-03-21 PROCEDURE — 1100000000 HC RM PRIVATE

## 2023-03-21 PROCEDURE — 6370000000 HC RX 637 (ALT 250 FOR IP): Performed by: FAMILY MEDICINE

## 2023-03-21 PROCEDURE — 6370000000 HC RX 637 (ALT 250 FOR IP): Performed by: EMERGENCY MEDICINE

## 2023-03-21 PROCEDURE — 6370000000 HC RX 637 (ALT 250 FOR IP): Performed by: INTERNAL MEDICINE

## 2023-03-21 RX ADMIN — ACETAMINOPHEN 650 MG: 325 TABLET, FILM COATED ORAL at 14:26

## 2023-03-21 RX ADMIN — B-COMPLEX W/ C & FOLIC ACID TAB 1 TABLET: TAB at 08:41

## 2023-03-21 RX ADMIN — TRIAMCINOLONE ACETONIDE: 1 CREAM TOPICAL at 08:48

## 2023-03-21 RX ADMIN — TRAZODONE HYDROCHLORIDE 100 MG: 50 TABLET ORAL at 21:09

## 2023-03-21 RX ADMIN — TRAMADOL HYDROCHLORIDE 25 MG: 50 TABLET ORAL at 08:39

## 2023-03-21 RX ADMIN — CITALOPRAM HYDROBROMIDE 40 MG: 20 TABLET ORAL at 08:40

## 2023-03-21 RX ADMIN — TRIAMCINOLONE ACETONIDE: 1 CREAM TOPICAL at 21:09

## 2023-03-21 RX ADMIN — NYSTATIN 15 ML: 100000 SUSPENSION ORAL at 10:28

## 2023-03-21 RX ADMIN — FLUTICASONE PROPIONATE 1 SPRAY: 50 SPRAY, METERED NASAL at 08:48

## 2023-03-21 RX ADMIN — NYSTATIN 15 ML: 100000 SUSPENSION ORAL at 21:09

## 2023-03-21 ASSESSMENT — PAIN DESCRIPTION - LOCATION
LOCATION: BACK

## 2023-03-21 ASSESSMENT — PAIN DESCRIPTION - ORIENTATION
ORIENTATION: LOWER
ORIENTATION: LOWER

## 2023-03-21 ASSESSMENT — PAIN SCALES - GENERAL
PAINLEVEL_OUTOF10: 8
PAINLEVEL_OUTOF10: 7
PAINLEVEL_OUTOF10: 9

## 2023-03-21 ASSESSMENT — PAIN DESCRIPTION - DESCRIPTORS
DESCRIPTORS: STABBING;SHARP;ACHING
DESCRIPTORS: STABBING

## 2023-03-21 NOTE — CARE COORDINATION
Interdisciplinary team meeting with attending, CM, nursing, PT and nutritional services for plan of care Spoke with Estrella Masonsayra at [facility] [phone] and she has a bed that will be available on Friday 3/24. She will come by Wednesday 3/22 to see patient at 9 am and do an assessment. CM met with patient and she is alert and oriented to person, place, and able to state who the president is and that she needs to go somewhere to stay as she gets confused. Patient in agreement with meeting with Radha from [facility] tomorrow. CM spoke with Celsonadira Monahan [phone] who is reaching out to son to let him know possible bed available on Friday. Explained that they likely would need someone to sign patient into the [facility]. She states will look into her check for April as the one for March had to be used for her rent and electricity. ZAIN also received information from [facility] about possible bed offer. ZAIN continues to follow.

## 2023-03-21 NOTE — PLAN OF CARE
Problem: Discharge Planning  Goal: Discharge to home or other facility with appropriate resources  3/21/2023 1356 by Willian Olmstead RN  Outcome: Progressing  3/21/2023 1356 by Willian Olmstead RN  Outcome: Progressing  3/21/2023 0126 by Evonne Saba RN  Outcome: Progressing     Problem: Pain  Goal: Verbalizes/displays adequate comfort level or baseline comfort level  3/21/2023 1356 by Willian Olmstead RN  Outcome: Progressing  3/21/2023 1356 by Willian Olmstead RN  Outcome: Progressing  3/21/2023 0126 by Evonne Saba RN  Outcome: Progressing     Problem: Safety - Adult  Goal: Free from fall injury  3/21/2023 1356 by Willian Olmstead RN  Outcome: Progressing  3/21/2023 1356 by Willian Olmstead RN  Outcome: Progressing  3/21/2023 0126 by Evonne Saba RN  Outcome: Progressing
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
## 2023-03-22 LAB
SARS-COV-2 RDRP RESP QL NAA+PROBE: NOT DETECTED
SOURCE: NORMAL

## 2023-03-22 PROCEDURE — 6370000000 HC RX 637 (ALT 250 FOR IP): Performed by: FAMILY MEDICINE

## 2023-03-22 PROCEDURE — 6370000000 HC RX 637 (ALT 250 FOR IP): Performed by: INTERNAL MEDICINE

## 2023-03-22 PROCEDURE — 1100000000 HC RM PRIVATE

## 2023-03-22 PROCEDURE — 6370000000 HC RX 637 (ALT 250 FOR IP): Performed by: HOSPITALIST

## 2023-03-22 PROCEDURE — 87635 SARS-COV-2 COVID-19 AMP PRB: CPT

## 2023-03-22 PROCEDURE — 6370000000 HC RX 637 (ALT 250 FOR IP): Performed by: EMERGENCY MEDICINE

## 2023-03-22 RX ORDER — TRAMADOL HYDROCHLORIDE 50 MG/1
25 TABLET ORAL EVERY 6 HOURS PRN
Status: DISCONTINUED | OUTPATIENT
Start: 2023-03-22 | End: 2023-03-24 | Stop reason: HOSPADM

## 2023-03-22 RX ADMIN — TRAZODONE HYDROCHLORIDE 100 MG: 50 TABLET ORAL at 20:42

## 2023-03-22 RX ADMIN — TRAMADOL HYDROCHLORIDE 25 MG: 50 TABLET ORAL at 20:44

## 2023-03-22 RX ADMIN — ACETAMINOPHEN 650 MG: 325 TABLET, FILM COATED ORAL at 18:27

## 2023-03-22 RX ADMIN — FLUTICASONE PROPIONATE 1 SPRAY: 50 SPRAY, METERED NASAL at 08:29

## 2023-03-22 RX ADMIN — CITALOPRAM HYDROBROMIDE 40 MG: 20 TABLET ORAL at 08:27

## 2023-03-22 RX ADMIN — TRIAMCINOLONE ACETONIDE: 1 CREAM TOPICAL at 08:29

## 2023-03-22 RX ADMIN — TRIAMCINOLONE ACETONIDE: 1 CREAM TOPICAL at 20:46

## 2023-03-22 RX ADMIN — TRAMADOL HYDROCHLORIDE 25 MG: 50 TABLET ORAL at 08:28

## 2023-03-22 RX ADMIN — B-COMPLEX W/ C & FOLIC ACID TAB 1 TABLET: TAB at 08:27

## 2023-03-22 ASSESSMENT — PAIN DESCRIPTION - LOCATION
LOCATION: BACK
LOCATION: BACK
LOCATION: HEAD

## 2023-03-22 ASSESSMENT — PAIN SCALES - GENERAL
PAINLEVEL_OUTOF10: 9
PAINLEVEL_OUTOF10: 7
PAINLEVEL_OUTOF10: 0
PAINLEVEL_OUTOF10: 7
PAINLEVEL_OUTOF10: 4

## 2023-03-22 ASSESSMENT — PAIN DESCRIPTION - DESCRIPTORS
DESCRIPTORS: ACHING
DESCRIPTORS: ACHING;SORE

## 2023-03-22 NOTE — PROGRESS NOTES
%  O2 Device: None (Room air)    Estimated body mass index is 16.11 kg/m² as calculated from the following:    Height as of this encounter: 5' 2\" (1.575 m). Weight as of this encounter: 88 lb 1.6 oz (40 kg). No intake or output data in the 24 hours ending 03/22/23 0853        Physical Exam:     Blood pressure 112/69, pulse 73, temperature 98.1 °F (36.7 °C), temperature source Oral, resp. rate 17, height 5' 2\" (1.575 m), weight 88 lb 1.6 oz (40 kg), SpO2 97 %. General:    Alert, awake, frail, elderly, NAD, on room air  Head:  Normocephalic, atraumatic  Eyes:  Sclerae appear normal.  Pupils equally round. ENT:  Nares appear normal.  Moist oral mucosa  Neck:  No restricted ROM. Trachea midline   CV:   RRR. No m/r/g. No jugular venous distension. Lungs:   CTAB. No wheezing, rhonchi, or rales. Symmetric expansion. Abdomen:   Soft, nontender, nondistended. Extremities: No cyanosis or clubbing. No edema  Skin:     No rashes and normal coloration. Warm and dry. Neuro:  GCS 15, cranial is intact, no motor or sensory deficit  Psych:  AOx3, mood and affect appropriate    I have personally reviewed labs and tests:  Recent Labs:  Recent Results (from the past 48 hour(s))   PLEASE READ & DOCUMENT PPD TEST IN 72 HRS    Collection Time: 03/21/23  6:10 PM   Result Value Ref Range    PPD, (POC) Negative Negative    mm Induration 0 0 - 5 mm         I have personally reviewed imaging studies:  Other Studies:  CT HEAD WO CONTRAST   Final Result   No CT evidence of acute intracranial abnormality. XR CHEST PORTABLE   Final Result   Lungs are hyperinflated but otherwise clear. The heart is normal in   size. No pneumothorax. No pleural effusions.              Current Meds:  Current Facility-Administered Medications   Medication Dose Route Frequency    multivitamin 1 tablet  1 tablet Oral Daily    traZODone (DESYREL) tablet 100 mg  100 mg Oral Nightly    fluticasone (FLONASE) 50 MCG/ACT nasal spray 1 spray  1 spray Each Nostril Daily    magic (miracle) mouthwash with nystatin  15 mL Swish & Spit PRN    citalopram (CELEXA) tablet 40 mg  40 mg Oral Daily    traMADol (ULTRAM) tablet 25 mg  25 mg Oral Daily PRN    0.9 % sodium chloride infusion   IntraVENous PRN    potassium chloride (KLOR-CON M) extended release tablet 40 mEq  40 mEq Oral PRN    Or    potassium bicarb-citric acid (EFFER-K) effervescent tablet 40 mEq  40 mEq Oral PRN    Or    potassium chloride 10 mEq/100 mL IVPB (Peripheral Line)  10 mEq IntraVENous PRN    potassium chloride 10 mEq/100 mL IVPB (Peripheral Line)  10 mEq IntraVENous PRN    magnesium sulfate 2000 mg in 50 mL IVPB premix  2,000 mg IntraVENous PRN    ondansetron (ZOFRAN-ODT) disintegrating tablet 4 mg  4 mg Oral Q8H PRN    Or    ondansetron (ZOFRAN) injection 4 mg  4 mg IntraVENous Q6H PRN    polyethylene glycol (GLYCOLAX) packet 17 g  17 g Oral Daily PRN    bisacodyl (DULCOLAX) suppository 10 mg  10 mg Rectal Daily PRN    famotidine (PEPCID) tablet 10 mg  10 mg Oral Daily PRN    aluminum & magnesium hydroxide-simethicone (MAALOX) 200-200-20 MG/5ML suspension 30 mL  30 mL Oral Q6H PRN    acetaminophen (TYLENOL) tablet 650 mg  650 mg Oral Q6H PRN    Or    acetaminophen (TYLENOL) suppository 650 mg  650 mg Rectal Q6H PRN    triamcinolone (KENALOG) 0.1 % cream   Topical BID    hydrOXYzine pamoate (VISTARIL) capsule 25 mg  25 mg Oral Q6H PRN    nicotine (NICODERM CQ) 14 MG/24HR 1 patch  1 patch TransDERmal Daily       Signed:  Pavan Arriaza MD    Part of this note may have been written by using a voice dictation software. The note has been proof read but may still contain some grammatical/other typographical errors.

## 2023-03-22 NOTE — CARE COORDINATION
Interdisciplinary team meeting with attending, CM, nursing, PT and nutritional services for plan of care Spoke with Roselyn Parra at [facility] and they are will to accept patient for Friday. She will need $318.50 for the month of March and then they will accept her check for April. CM will contact Alicja Bell and update her on needed information. Per Radha after she saw patient she states patient can sign herself in to the Assisted living. It is not a lock down unit however CM notified Radha of patient wandering and behavior prior to admission and she states as long as patient has not been wandering or trying to get out of the hospital they can accept patient as they have staff at the facility and she will have a room mate. Addendum: Spoke with patient friend Alicja Bell [phone] and asked her for copy of Social Security card and Medicare card and she will get that. She also is aware of needing $318.50 for March payment and she states will do what she needs to do. Per Roselyn Parra [phone] at [facility] she can transport patient to the [address] [facility] on Friday would like her d/c before 1 pm to get medications. ZAIN confirmed with nursing that patient has not been wandering or trying to escape the hospital since admission. Patient anticipated to d/c Friday 3/24 to [facility].

## 2023-03-22 NOTE — PROGRESS NOTES
Current  Protein (g/day): 45-56 (1.2-1.5 g/kg) Weight Used: (Current) 37.2 kg  Fluid (ml/day):   (1 ml/kcal)    Nutrition Diagnosis:   Underweight related to cognitive or neurological impairment (altered behavior) as evidenced by BMI, weight loss  Moderate malnutrition, In context of social or environmental circumstances related to inadequate protein-energy intake as evidenced by Criteria as identified in malnutrition assessment  Nutrition Interventions:   Food and/or Nutrient Delivery: Continue Current Diet, Continue Oral Nutrition Supplement  Nutrition Education/Counseling: No recommendation at this time  Coordination of Nutrition Care: Continue to monitor while inpatient, Interdisciplinary Rounds       Goals:   Previous Goal Met: Goal(s) Achieved  Active Goal: Meet at least 75% of estimated needs, by next RD assessment       Nutrition Monitoring and Evaluation:   Behavioral-Environmental Outcomes: None Identified  Food/Nutrient Intake Outcomes: Food and Nutrient Intake, Supplement Intake  Physical Signs/Symptoms Outcomes: Weight, Meal Time Behavior    Discharge Planning:    Continue Oral Nutrition Supplement    Armand Blanchard RD

## 2023-03-22 NOTE — PLAN OF CARE
Problem: Discharge Planning  Goal: Discharge to home or other facility with appropriate resources  3/22/2023 0030 by Shelley Belcher RN  Outcome: Progressing  3/21/2023 1356 by Colin Martínez RN  Outcome: Progressing  3/21/2023 1356 by Colin Martínez RN  Outcome: Progressing     Problem: Pain  Goal: Verbalizes/displays adequate comfort level or baseline comfort level  3/22/2023 0030 by Shelley Belcher RN  Outcome: Progressing  3/21/2023 1356 by Colin Martínez RN  Outcome: Progressing  3/21/2023 1356 by Colin Martínez RN  Outcome: Progressing     Problem: Safety - Adult  Goal: Free from fall injury  3/22/2023 0030 by Shelley Belcher RN  Outcome: Progressing  3/21/2023 1356 by Colin Martínez RN  Outcome: Progressing  3/21/2023 1356 by Colin Martínez RN  Outcome: Progressing

## 2023-03-23 LAB
GLUCOSE BLD STRIP.AUTO-MCNC: 107 MG/DL (ref 65–100)
SERVICE CMNT-IMP: ABNORMAL

## 2023-03-23 PROCEDURE — 6370000000 HC RX 637 (ALT 250 FOR IP): Performed by: HOSPITALIST

## 2023-03-23 PROCEDURE — 1100000000 HC RM PRIVATE

## 2023-03-23 PROCEDURE — 82962 GLUCOSE BLOOD TEST: CPT

## 2023-03-23 PROCEDURE — 6370000000 HC RX 637 (ALT 250 FOR IP): Performed by: EMERGENCY MEDICINE

## 2023-03-23 PROCEDURE — 6370000000 HC RX 637 (ALT 250 FOR IP): Performed by: FAMILY MEDICINE

## 2023-03-23 PROCEDURE — 6370000000 HC RX 637 (ALT 250 FOR IP): Performed by: INTERNAL MEDICINE

## 2023-03-23 RX ADMIN — TRAMADOL HYDROCHLORIDE 25 MG: 50 TABLET ORAL at 09:34

## 2023-03-23 RX ADMIN — NYSTATIN 15 ML: 100000 SUSPENSION ORAL at 20:49

## 2023-03-23 RX ADMIN — TRIAMCINOLONE ACETONIDE: 1 CREAM TOPICAL at 20:52

## 2023-03-23 RX ADMIN — B-COMPLEX W/ C & FOLIC ACID TAB 1 TABLET: TAB at 08:10

## 2023-03-23 RX ADMIN — FLUTICASONE PROPIONATE 1 SPRAY: 50 SPRAY, METERED NASAL at 11:38

## 2023-03-23 RX ADMIN — TRAMADOL HYDROCHLORIDE 25 MG: 50 TABLET ORAL at 20:49

## 2023-03-23 RX ADMIN — TRAMADOL HYDROCHLORIDE 25 MG: 50 TABLET ORAL at 15:48

## 2023-03-23 RX ADMIN — TRIAMCINOLONE ACETONIDE: 1 CREAM TOPICAL at 11:38

## 2023-03-23 RX ADMIN — NYSTATIN 15 ML: 100000 SUSPENSION ORAL at 09:34

## 2023-03-23 RX ADMIN — CITALOPRAM HYDROBROMIDE 40 MG: 20 TABLET ORAL at 08:10

## 2023-03-23 RX ADMIN — TRAZODONE HYDROCHLORIDE 100 MG: 50 TABLET ORAL at 20:49

## 2023-03-23 ASSESSMENT — PAIN DESCRIPTION - LOCATION
LOCATION: BACK

## 2023-03-23 ASSESSMENT — PAIN DESCRIPTION - DESCRIPTORS
DESCRIPTORS: ACHING

## 2023-03-23 ASSESSMENT — PAIN SCALES - GENERAL
PAINLEVEL_OUTOF10: 0
PAINLEVEL_OUTOF10: 6
PAINLEVEL_OUTOF10: 7

## 2023-03-23 NOTE — PLAN OF CARE
Problem: Discharge Planning  Goal: Discharge to home or other facility with appropriate resources  3/23/2023 1117 by Hiral Lewis RN  Outcome: Progressing  3/22/2023 2338 by Shamika Spring RN  Outcome: Progressing     Problem: Pain  Goal: Verbalizes/displays adequate comfort level or baseline comfort level  3/23/2023 1117 by Hiral Lewis RN  Outcome: Progressing  3/22/2023 2338 by Shamika Spring RN  Outcome: Progressing     Problem: Safety - Adult  Goal: Free from fall injury  3/23/2023 1117 by Hiral Lewis RN  Outcome: Progressing  3/22/2023 2338 by Shamika Spring RN  Outcome: Progressing

## 2023-03-24 VITALS
HEIGHT: 62 IN | RESPIRATION RATE: 17 BRPM | WEIGHT: 88.1 LBS | HEART RATE: 76 BPM | DIASTOLIC BLOOD PRESSURE: 67 MMHG | TEMPERATURE: 97.7 F | SYSTOLIC BLOOD PRESSURE: 115 MMHG | BODY MASS INDEX: 16.21 KG/M2 | OXYGEN SATURATION: 99 %

## 2023-03-24 PROCEDURE — 6370000000 HC RX 637 (ALT 250 FOR IP): Performed by: EMERGENCY MEDICINE

## 2023-03-24 PROCEDURE — 6370000000 HC RX 637 (ALT 250 FOR IP): Performed by: HOSPITALIST

## 2023-03-24 PROCEDURE — 6370000000 HC RX 637 (ALT 250 FOR IP): Performed by: FAMILY MEDICINE

## 2023-03-24 PROCEDURE — 6370000000 HC RX 637 (ALT 250 FOR IP): Performed by: INTERNAL MEDICINE

## 2023-03-24 RX ORDER — HYDROXYZINE PAMOATE 25 MG/1
25 CAPSULE ORAL EVERY 6 HOURS PRN
Qty: 40 CAPSULE | Refills: 1 | Status: SHIPPED | OUTPATIENT
Start: 2023-03-24 | End: 2023-04-03

## 2023-03-24 RX ORDER — TRAMADOL HYDROCHLORIDE 50 MG/1
25 TABLET ORAL EVERY 6 HOURS PRN
Qty: 20 TABLET | Refills: 1 | Status: SHIPPED | OUTPATIENT
Start: 2023-03-24 | End: 2023-03-29

## 2023-03-24 RX ORDER — TRIAMCINOLONE ACETONIDE 1 MG/G
CREAM TOPICAL
Qty: 453.6 G | Refills: 1 | Status: SHIPPED | OUTPATIENT
Start: 2023-03-24

## 2023-03-24 RX ORDER — ALBUTEROL SULFATE 90 UG/1
2 AEROSOL, METERED RESPIRATORY (INHALATION) EVERY 4 HOURS PRN
Qty: 18 G | Refills: 3 | Status: SHIPPED | OUTPATIENT
Start: 2023-03-24

## 2023-03-24 RX ORDER — POLYETHYLENE GLYCOL 3350 17 G/17G
17 POWDER, FOR SOLUTION ORAL DAILY PRN
Qty: 14 EACH | Refills: 1 | Status: SHIPPED | OUTPATIENT
Start: 2023-03-24 | End: 2023-04-07

## 2023-03-24 RX ORDER — FLUTICASONE PROPIONATE 50 MCG
1 SPRAY, SUSPENSION (ML) NASAL DAILY
Qty: 16 G | Refills: 3 | Status: SHIPPED | OUTPATIENT
Start: 2023-03-24

## 2023-03-24 RX ORDER — PANTOPRAZOLE SODIUM 40 MG/1
40 TABLET, DELAYED RELEASE ORAL
Qty: 30 TABLET | Refills: 3 | Status: SHIPPED | OUTPATIENT
Start: 2023-03-24

## 2023-03-24 RX ORDER — TRAZODONE HYDROCHLORIDE 150 MG/1
150 TABLET ORAL NIGHTLY
Qty: 30 TABLET | Refills: 3 | Status: SHIPPED | OUTPATIENT
Start: 2023-03-24 | End: 2023-04-23

## 2023-03-24 RX ORDER — BUDESONIDE AND FORMOTEROL FUMARATE DIHYDRATE 160; 4.5 UG/1; UG/1
2 AEROSOL RESPIRATORY (INHALATION) 2 TIMES DAILY
Qty: 10.2 G | Refills: 3 | Status: SHIPPED | OUTPATIENT
Start: 2023-03-24

## 2023-03-24 RX ORDER — NICOTINE 21 MG/24HR
1 PATCH, TRANSDERMAL 24 HOURS TRANSDERMAL DAILY
Qty: 30 PATCH | Refills: 3 | Status: SHIPPED | OUTPATIENT
Start: 2023-03-24

## 2023-03-24 RX ADMIN — TRAMADOL HYDROCHLORIDE 25 MG: 50 TABLET ORAL at 08:41

## 2023-03-24 RX ADMIN — POLYETHYLENE GLYCOL 3350 17 G: 17 POWDER, FOR SOLUTION ORAL at 09:57

## 2023-03-24 RX ADMIN — CITALOPRAM HYDROBROMIDE 40 MG: 20 TABLET ORAL at 08:33

## 2023-03-24 RX ADMIN — B-COMPLEX W/ C & FOLIC ACID TAB 1 TABLET: TAB at 08:33

## 2023-03-24 RX ADMIN — FLUTICASONE PROPIONATE 1 SPRAY: 50 SPRAY, METERED NASAL at 08:37

## 2023-03-24 ASSESSMENT — PAIN DESCRIPTION - DESCRIPTORS: DESCRIPTORS: ACHING

## 2023-03-24 ASSESSMENT — PAIN DESCRIPTION - LOCATION: LOCATION: BACK

## 2023-03-24 ASSESSMENT — PAIN SCALES - GENERAL: PAINLEVEL_OUTOF10: 5

## 2023-03-24 NOTE — PLAN OF CARE
Problem: Discharge Planning  Goal: Discharge to home or other facility with appropriate resources  Outcome: Progressing   Discharge planned this afternoon. Problem: Safety - Adult  Goal: Free from fall injury  Outcome: Progressing. Pt cooperative with care; asks for assist when needed.

## 2023-03-24 NOTE — PROGRESS NOTES
Pt discharged to [facility] with paperwork in hand via wheelchair van. No further questions at this time.

## 2023-03-24 NOTE — DISCHARGE SUMMARY
Nostril route daily  Qty: 16 g, Refills: 3      hydrOXYzine pamoate (VISTARIL) 25 MG capsule Take 1 capsule by mouth every 6 hours as needed for Itching or Anxiety  Qty: 40 capsule, Refills: 1           CONTINUE these medications which have CHANGED    Details   albuterol sulfate HFA (PROVENTIL;VENTOLIN;PROAIR) 108 (90 Base) MCG/ACT inhaler Inhale 2 puffs into the lungs every 4 hours as needed for Wheezing  Qty: 18 g, Refills: 3      budesonide-formoterol (SYMBICORT) 160-4.5 MCG/ACT AERO Inhale 2 puffs into the lungs 2 times daily  Qty: 10.2 g, Refills: 3      traZODone (DESYREL) 150 MG tablet Take 1 tablet by mouth nightly  Qty: 30 tablet, Refills: 3           STOP taking these medications       citalopram (CELEXA) 40 MG tablet Comments:   Reason for Stopping:         HYDROcodone-acetaminophen (NORCO)  MG per tablet Comments:   Reason for Stopping:         morphine (MS CONTIN) 15 MG extended release tablet Comments:   Reason for Stopping:         oxyCODONE-acetaminophen (PERCOCET) 5-325 MG per tablet Comments:   Reason for Stopping:         polyethylene glycol (GLYCOLAX) 17 GM/SCOOP powder Comments:   Reason for Stopping:               Some medications may have been reported old/obsolete and marked \"stop taking\" by the system; in reality pt was already off these meds; defer to outpatient or prescribing providers. Procedures done this admission:  * No surgery found *    Consults this admission:  IP CONSULT TO CASE MANAGEMENT  IP CONSULT TO SOCIAL WORK    Echocardiogram results:  No results found for this or any previous visit. Diagnostic Imaging/Tests:   CT HEAD WO CONTRAST    Result Date: 2/22/2023  No CT evidence of acute intracranial abnormality. XR CHEST PORTABLE    Result Date: 2/22/2023  Lungs are hyperinflated but otherwise clear. The heart is normal in size. No pneumothorax. No pleural effusions.         Labs: Results:       BMP, Mg, Phos No results for input(s): NA, K, CL, CO2, ANIONGAP,

## 2023-03-24 NOTE — CARE COORDINATION
D/c paperwork faxed  to [facility] including d/c sum, [facility] completed paperwork, Covid, PPD and prescriptions. Leann's w/c Prateek Tracey to provide transport to ensure she safe transfer. Dir Willian of  approved vac cost billed to [facility]. VM left for APS  Zofia Breen, [phone] re: her discharge today to [facility]. Spoke with Bienvenido Parr at [phone]. She is off work today but sergey to call Irasema Carney their resident Care Director to arrange admission. Radha has already met Ms. Hays Shadi and agreed to her admission. Spoke w/ Irasema Carney their resident coordinator and was given Room # 20. Paperwork and scripts will fax to [phone].

## 2023-03-28 ENCOUNTER — TELEPHONE (OUTPATIENT)
Dept: FAMILY MEDICINE CLINIC | Facility: CLINIC | Age: 73
End: 2023-03-28

## 2023-03-28 NOTE — TELEPHONE ENCOUNTER
----- Message from Mukesh Louis LPN sent at 2/26/2727 10:57 AM EDT -----  Regarding: Appt  Please schedule hospital f/up--Thanks!!!!